# Patient Record
Sex: MALE | Race: ASIAN | NOT HISPANIC OR LATINO | Employment: FULL TIME | URBAN - METROPOLITAN AREA
[De-identification: names, ages, dates, MRNs, and addresses within clinical notes are randomized per-mention and may not be internally consistent; named-entity substitution may affect disease eponyms.]

---

## 2018-08-06 ENCOUNTER — OFFICE VISIT (OUTPATIENT)
Dept: ORTHOPEDICS | Facility: CLINIC | Age: 23
End: 2018-08-06
Payer: COMMERCIAL

## 2018-08-06 ENCOUNTER — TELEPHONE (OUTPATIENT)
Dept: ORTHOPEDICS | Facility: CLINIC | Age: 23
End: 2018-08-06

## 2018-08-06 ENCOUNTER — OFFICE VISIT (OUTPATIENT)
Dept: FAMILY MEDICINE | Facility: CLINIC | Age: 23
End: 2018-08-06
Payer: COMMERCIAL

## 2018-08-06 VITALS
WEIGHT: 283 LBS | TEMPERATURE: 98.8 F | OXYGEN SATURATION: 98 % | BODY MASS INDEX: 48.32 KG/M2 | SYSTOLIC BLOOD PRESSURE: 132 MMHG | DIASTOLIC BLOOD PRESSURE: 84 MMHG | HEART RATE: 83 BPM | HEIGHT: 64 IN | RESPIRATION RATE: 16 BRPM

## 2018-08-06 VITALS
DIASTOLIC BLOOD PRESSURE: 133 MMHG | SYSTOLIC BLOOD PRESSURE: 163 MMHG | BODY MASS INDEX: 48.32 KG/M2 | HEART RATE: 87 BPM | HEIGHT: 64 IN | WEIGHT: 283 LBS

## 2018-08-06 DIAGNOSIS — R20.0 BILATERAL HAND NUMBNESS: Primary | ICD-10-CM

## 2018-08-06 DIAGNOSIS — G56.03 BILATERAL CARPAL TUNNEL SYNDROME: Primary | ICD-10-CM

## 2018-08-06 PROCEDURE — 99213 OFFICE O/P EST LOW 20 MIN: CPT | Performed by: NURSE PRACTITIONER

## 2018-08-06 PROCEDURE — 99243 OFF/OP CNSLTJ NEW/EST LOW 30: CPT | Performed by: ORTHOPAEDIC SURGERY

## 2018-08-06 RX ORDER — PREDNISONE 20 MG/1
TABLET ORAL
Qty: 18 TABLET | Refills: 0 | Status: SHIPPED | OUTPATIENT
Start: 2018-08-06 | End: 2019-02-25

## 2018-08-06 ASSESSMENT — PAIN SCALES - GENERAL
PAINLEVEL: SEVERE PAIN (7)
PAINLEVEL: MODERATE PAIN (5)

## 2018-08-06 NOTE — LETTER
[facility] [place]  [address] [facility] [address]  [place] [address]  [phone]  WORKABILITY    [facility], Regina Palomares M.D., [place]        8/6/2018      RE: Lauri Ma    [address]  [place] [address]        To whom it may concern:     Lauri Ma is under my care for   1. Bilateral hand numbness          Return to work date: 8/6/18     ** WITH RESTRICTIONS? No restrictions      Next appointment: after EMG test has been completed        Electronically Signed (as below)  Dr. Ravinder Wright M.D.

## 2018-08-06 NOTE — LETTER
[facility]  [id number] [place] [id number]  Phone: [phone]    August 6, 2018        Lauri Ma  [address]  [place] [facility] [address]          To whom it may concern:    RE: Lauri Ma    Patient was seen and treated today at our clinic and missed work.    Please contact me for questions or concerns.      Sincerely,        VALERIE Maldonado CNP

## 2018-08-06 NOTE — LETTER
"    8/6/2018         RE: Lauri Ma  [address]  [place] [address]        Dear Colleague,    Thank you for referring your patient, Lauri Ma, to the [facility] [place]. Please see a copy of my visit note below.    CHIEF COMPLAINT:   Chief Complaint   Patient presents with     Cts     Bilateral CTS. Onset: 7/20/18. Patient states he has pain in bilateral wrist, N/T in bilateral hands. Pain with lifting or bending hands. He has wore braces on his wrists at night, sometimes during the day.     Lauri Ma is seen today in the [facility] for evaluation of bilateral carpal tunnel syndrome at the request of VALERIE Centeno CNP     HISTORY:  Lauri Ma is a 23 year old male , right -hand dominant who is seen in for bilateral hand pain, numbness and tingling. Symptoms have overall been present for about 5 years. Symptoms started after working with [employer]. Improved when he quit his job. Recently started another job delivering tiles and started have carpal tunnel syndrome like symptoms again. Today he pas severe, pain, 7/10. Numbness and tingling of the radial 4 fingers. Does not affect the small finger. Also feels the fingers are stiff and \"bloated\". His symptoms are aggravated with driving and holding the phone up for long periods of time. No recent treatments. Used to wear wrist braces 5 years ago but once his symptoms resolved, he quit using them and lost them. Recently started on oral prednisone.      Suspected cause: Due to work related movements.    Pain severity: 7/10  Pain quality: aching  Frequency of symptoms: frequently.  Aggravating Factors: with lifting, and using the hands.  Relieving Factors: at rest.  Previous modalities tried: bilateral splints  Prior wrist injury/trauma: none    Usual level of recreational activity: sedentary  Usual level of work activity: heavy labor - climbing/heavy lifting    Orthopedic PMH: history of bilateral carpal tunnel " syndrome in the past.     Other PMH:  has a past medical history of Acromioclavicular joint separation and CARDIOVASCULAR SCREENING; LDL GOAL LESS THAN 160.  Patient Active Problem List    Diagnosis Date Noted     Overweight (BMI 25.0-29.9) 05/21/2013     Priority: Medium     Acromioclavicular joint separation      Priority: Medium     CARDIOVASCULAR SCREENING; LDL GOAL LESS THAN 160      Priority: Medium       Surgical Hx:  has a past surgical history that includes no history of surgery.    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 He has never used smokeless tobacco. He reports that he drinks alcohol. He reports that he does not use illicit drugs.    Family Hx: family history includes Cancer in his maternal grandmother; Cerebrovascular Disease in his maternal grandfather; Unknown/Adopted in his paternal grandfather and paternal grandmother.. Negative for bleeding/clotting disorders. Negative for adverse anesthesia reactions.    REVIEW OF SYSTEMS: 10 point ROS neg other than the symptoms noted above in the HPI and PMH. Notables include  CONSTITUTIONAL:NEGATIVE for fever, chills, change in weight  INTEGUMENTARY/SKIN: NEGATIVE for worrisome rashes, moles or lesions  MUSCULOSKELETAL:See HPI above  Neurology: see HPI above.    This document serves as a record of the services and decisions personally performed and made by Ravinder Wright MD. It was created on his behalf by Alena Youngblood, a trained medical scribe. The creation of this document is based  "the provider's statements to the medical scribe.    Naderibanamaria Youngblood 4:01 PM 8/6/2018       EXAM:  BP (!) 163/133  Pulse 87  Ht 5' 4\" (1.626 m)  Wt 283 lb (128.4 kg)  BMI 48.58 kg/m2  GENERAL APPEARANCE: obese, alert and no distress   GAIT: NORMAL  SKIN: no suspicious lesions or rashes  RESPIRATORY: No increased work of breathing.  NEURO:     strength: normal,    thenar fasiculations: negative    Thenar atrophy: none.    Sensation intact in all fingers,    reflexes normal in upper extremities.   PSYCH:  mentation appears normal and affect normal, not anxious.    MUSCULOSKELETAL:    RIGHT HAND/FINGERS:    Skin intact. No abnormal skin discoloration, erythema or ecchymosis.   No nail pitting or clubbing.  Normal wear pattern, color and tone.  No observable or palpable masses of the fingers or palm or wrist.  No palpable triggering of fingers.   No observable or palpable cords or nodules of the fingers or palm.    There is no swelling in the wrist, hand, forearm.  There is no tenderness in the wrist.  There is no ecchymosis.  There is no erythema of the surrounding skin.  There is no maceration of the skin.  There is no deformity in the area.  Intact extensors. No extensor lag.    Special tests wrist:    Tinel's positive    Flexion/compression test positive.     Special tests medial elbow ulnar nerve:    Tinel's negative,     Special tests median nerve proximal forearm:    Tinel's negative.    1st carpometacarpal grind: negative    Intact sensation light touch median, radial, ulnar nerves of the hand  Intact sensation to the radial and ulnar digital nerves of the fingers, as well as the finger tips.  Intact epl fpl fdp edc wrist flexion/extension biceps/triceps deltoid  Brisk capillary refill to all fingers.   Palpable radial pulse, 2+.      LEFT HAND/FINGERS:    Skin intact. No abnormal skin discoloration, erythema or ecchymosis.   No nail pitting or clubbing.  Normal wear pattern, color and tone.  No " observable or palpable masses of the fingers or palm or wrist.  No palpable triggering of fingers.   No observable or palpable cords or nodules of the fingers or palm.    There is no swelling in the wrist, hand, forearm.  There is no tenderness in the wrist.  There is no ecchymosis.  There is no erythema of the surrounding skin.  There is no maceration of the skin.  There is no deformity in the area.  Intact extensors. No extensor lag.    Special tests wrist:    Tinel's equivocal ,    Flexion/compression test positive .    Special tests medial elbow ulnar nerve:    Tinel's negative,     Special tests median nerve proximal forearm:    Tinel's negative.    1st carpometacarpal grind: negative     Intact sensation light touch median, radial, ulnar nerves of the hand  Intact sensation to the radial and ulnar digital nerves of the fingers, as well as the finger tips.  Intact epl fpl fdp edc wrist flexion/extension biceps/triceps deltoid  Brisk capillary refill to all fingers.   Palpable radial pulse, 2+.      XRAYS: none.    ASSESSMENT: 23 year old male with bilateral hand pain, numbness and tingling, likely carpal tunnel syndrome.    PLAN:    * discussed with patient signs and symptoms consistent with carpal tunnel syndrome. Carpal tunnel syndrome is compression or pinching of the median nerve at the wrist, as it enters the hand. There are many different causes, and in most cases, multifactorial.  * Reviewed imaging with patient. Also, clinical exam findings.  * Discussed with patient that based on physical exam findings, it is not possible to completely rule out other neurological issues.  * An EMG of bilateral upper extremity was ordered for further evaluation.     * Rest  * Activity modification - avoid activities that aggravate symptoms.  * NSAIDS - regular use for inflammation, with food, as long as no contra-indications. Please discuss with pcp if needed.  * Ice twice daily to three times daily.  * Compression  wrap  * Elevation of extremity to reduce swelling  * Tylenol as needed for pain    * After having the EMG , I would like the patient to call me so that we can discuss the results as well as how to proceed.  * Patient to follow up with Primary Care provider regarding elevated blood pressure        The information in this document, created by a scribe for me, accurately reflects the services I personally performed and the decisions made by me. I have reviewed and approved this document for accuracy.        Ravinder Wrgiht M.D., M.S.  Dept. of Orthopaedic Surgery  [facility]      Patient was fitted with bilateral short wrist braces, flattened. All questions were answered to patient's satisfaction. DME form explained, signed, and copy given to the patient for their records.   Beverly Fuentes Clinical Medical Assistant        Again, thank you for allowing me to participate in the care of your patient.        Sincerely,        Ravinder Wright MD

## 2018-08-06 NOTE — PROGRESS NOTES
Patient was fitted with bilateral short wrist braces, flattened. All questions were answered to patient's satisfaction. DME form explained, signed, and copy given to the patient for their records.   Beverly Fuentes Clinical Medical Assistant

## 2018-08-06 NOTE — PROGRESS NOTES
"CHIEF COMPLAINT:   Chief Complaint   Patient presents with     Cts     Bilateral CTS. Onset: 7/20/18. Patient states he has pain in bilateral wrist, N/T in bilateral hands. Pain with lifting or bending hands. He has wore braces on his wrists at night, sometimes during the day.     Lauri Ma is seen today in the [facility] for evaluation of bilateral carpal tunnel syndrome at the request of VALERIE Centeno CNP     HISTORY:  Lauri Ma is a 23 year old male , right -hand dominant who is seen in for bilateral hand pain, numbness and tingling. Symptoms have overall been present for about 5 years. Symptoms started after working with [employer]. Improved when he quit his job. Recently started another job delivering tiles and started have carpal tunnel syndrome like symptoms again. Today he pas severe, pain, 7/10. Numbness and tingling of the radial 4 fingers. Does not affect the small finger. Also feels the fingers are stiff and \"bloated\". His symptoms are aggravated with driving and holding the phone up for long periods of time. No recent treatments. Used to wear wrist braces 5 years ago but once his symptoms resolved, he quit using them and lost them. Recently started on oral prednisone.      Suspected cause: Due to work related movements.    Pain severity: 7/10  Pain quality: aching  Frequency of symptoms: frequently.  Aggravating Factors: with lifting, and using the hands.  Relieving Factors: at rest.  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     Other PMH:  has a past medical history of Acromioclavicular joint separation and CARDIOVASCULAR SCREENING; LDL GOAL LESS THAN 160.  Patient Active Problem List    Diagnosis Date Noted     Overweight (BMI 25.0-29.9) 05/21/2013 " "    Priority: Medium     Acromioclavicular joint separation      Priority: Medium     CARDIOVASCULAR SCREENING; LDL GOAL LESS THAN 160      Priority: Medium       Surgical Hx:  has a past surgical history that includes no history of surgery.    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 He has never used smokeless tobacco. He reports that he drinks alcohol. He reports that he does not use illicit drugs.    Family Hx: family history includes Cancer in his maternal grandmother; Cerebrovascular Disease in his maternal grandfather; Unknown/Adopted in his paternal grandfather and paternal grandmother.. Negative for bleeding/clotting disorders. Negative for adverse anesthesia reactions.    REVIEW OF SYSTEMS: 10 point ROS neg other than the symptoms noted above in the HPI and PMH. Notables include  CONSTITUTIONAL:NEGATIVE for fever, chills, change in weight  INTEGUMENTARY/SKIN: NEGATIVE for worrisome rashes, moles or lesions  MUSCULOSKELETAL:See HPI above  Neurology: see HPI above.    This document serves as a record of the services and decisions personally performed and made by Ravinder Wright MD. It was created on his behalf by Alena Youngblood, a trained medical scribe. The creation of this document is based the provider's statements to the medical scribe.    Cristin Youngblood 4:01 PM 8/6/2018       EXAM:  BP (!) 163/133  Pulse 87  Ht 5' 4\" (1.626 m)  Wt 283 lb (128.4 kg)  BMI 48.58 kg/m2  GENERAL APPEARANCE: obese, alert and no distress   GAIT: " NORMAL  SKIN: no suspicious lesions or rashes  RESPIRATORY: No increased work of breathing.  NEURO:     strength: normal,    thenar fasiculations: negative    Thenar atrophy: none.    Sensation intact in all fingers,    reflexes normal in upper extremities.   PSYCH:  mentation appears normal and affect normal, not anxious.    MUSCULOSKELETAL:    RIGHT HAND/FINGERS:    Skin intact. No abnormal skin discoloration, erythema or ecchymosis.   No nail pitting or clubbing.  Normal wear pattern, color and tone.  No observable or palpable masses of the fingers or palm or wrist.  No palpable triggering of fingers.   No observable or palpable cords or nodules of the fingers or palm.    There is no swelling in the wrist, hand, forearm.  There is no tenderness in the wrist.  There is no ecchymosis.  There is no erythema of the surrounding skin.  There is no maceration of the skin.  There is no deformity in the area.  Intact extensors. No extensor lag.    Special tests wrist:    Tinel's positive    Flexion/compression test positive.     Special tests medial elbow ulnar nerve:    Tinel's negative,     Special tests median nerve proximal forearm:    Tinel's negative.    1st carpometacarpal grind: negative    Intact sensation light touch median, radial, ulnar nerves of the hand  Intact sensation to the radial and ulnar digital nerves of the fingers, as well as the finger tips.  Intact epl fpl fdp edc wrist flexion/extension biceps/triceps deltoid  Brisk capillary refill to all fingers.   Palpable radial pulse, 2+.      LEFT HAND/FINGERS:    Skin intact. No abnormal skin discoloration, erythema or ecchymosis.   No nail pitting or clubbing.  Normal wear pattern, color and tone.  No observable or palpable masses of the fingers or palm or wrist.  No palpable triggering of fingers.   No observable or palpable cords or nodules of the fingers or palm.    There is no swelling in the wrist, hand, forearm.  There is no tenderness in the  wrist.  There is no ecchymosis.  There is no erythema of the surrounding skin.  There is no maceration of the skin.  There is no deformity in the area.  Intact extensors. No extensor lag.    Special tests wrist:    Tinel's equivocal ,    Flexion/compression test positive .    Special tests medial elbow ulnar nerve:    Tinel's negative,     Special tests median nerve proximal forearm:    Tinel's negative.    1st carpometacarpal grind: negative     Intact sensation light touch median, radial, ulnar nerves of the hand  Intact sensation to the radial and ulnar digital nerves of the fingers, as well as the finger tips.  Intact epl fpl fdp edc wrist flexion/extension biceps/triceps deltoid  Brisk capillary refill to all fingers.   Palpable radial pulse, 2+.      XRAYS: none.    ASSESSMENT: 23 year old male with bilateral hand pain, numbness and tingling, likely carpal tunnel syndrome.    PLAN:    * discussed with patient signs and symptoms consistent with carpal tunnel syndrome. Carpal tunnel syndrome is compression or pinching of the median nerve at the wrist, as it enters the hand. There are many different causes, and in most cases, multifactorial.  * Reviewed imaging with patient. Also, clinical exam findings.  * Discussed with patient that based on physical exam findings, it is not possible to completely rule out other neurological issues.  * An EMG of bilateral upper extremity was ordered for further evaluation.     * Rest  * Activity modification - avoid activities that aggravate symptoms.  * NSAIDS - regular use for inflammation, with food, as long as no contra-indications. Please discuss with pcp if needed.  * Ice twice daily to three times daily.  * Compression wrap  * Elevation of extremity to reduce swelling  * Tylenol as needed for pain    * After having the EMG , I would like the patient to call me so that we can discuss the results as well as how to proceed.  * Patient to follow up with Primary Care provider  regarding elevated blood pressure        The information in this document, created by a scribe for me, accurately reflects the services I personally performed and the decisions made by me. I have reviewed and approved this document for accuracy.        Ravinder Wright M.D., M.S.  Dept. of Orthopaedic Surgery  [facility]

## 2018-08-06 NOTE — MR AVS SNAPSHOT
After Visit Summary   8/6/2018    Lauri Ma    MRN: [id number]           Patient Information     Date Of Birth          1995        Visit Information        Provider Department      8/6/2018 12:00 PM Roseanna Mckeon APRN [facility]        Today's Diagnoses     Bilateral carpal tunnel syndrome    -  1      Care Instructions      Carpal Tunnel Syndrome    Carpal tunnel syndrome is a painful condition of the wrist and arm. It is caused by pressure on the median nerve.  The median nerve is one of the nerves that give feeling and movement to the hand. It passes through a tunnel in the wrist called the carpal tunnel. This tunnel is made up of bones and ligaments. Narrowing of this tunnel or swelling of the tissues inside the tunnel puts pressure on the median nerve. This causes numbness, pins and needles, or electric shooting pains in your hand and forearm. Often the pain is worse at night and may wake you when you are asleep.  Carpal tunnel syndrome may occur during pregnancy and with use of birth control pills. It is more common in workers who must often bend their wrists. It is also common in people who work with power tools that cause strong vibrations.  Home care    Rest the painful wrist. Avoid repeated bending of the wrist back and forth. This puts pressure on the median nerve. Avoid using power tools with strong vibrations.    If you were given a splint, wear it at night while you sleep. You may also wear it during the day for comfort.    Move your fingers and wrists often to avoid stiffness.    Elevate your arms on pillows when you lie down.    Try using the unaffected hand more.    Try not to hold your wrists in a bent, downward position.    Sometimes changes in the work place may ease symptoms. If you type most of the day, it may help to change the position of your keyboard or add a wrist support. Your wrist should be in a neutral position and not bent back when  typing.    You may use over-the-counter pain medicine to treat pain and inflammation, unless another medicine was prescribed. Anti-inflammatory pain medicines, such as ibuprofen or naproxen may be more effective than acetaminophen, which treats pain, but not inflammation. If you have chronic liver or kidney disease or ever had a stomach ulcer or GI bleeding, talk with your doctor before using these medicines.    Opioid pain medicine will only give temporary relief and does not treat the problem. If pain continues, you may need a shot of a steroid drug into your wrist.    If the above methods fail, you may need surgery. This will open the carpal tunnel and release the pressure on the trapped nerve.  Follow-up care  Follow up with your healthcare provider, or as advised, if the pain doesn t begin to improve within the next week.  If X-rays were taken, you will be notified of any new findings that may affect your care.  When to seek medical advice  Call your healthcare provider right away if any of these occur:    Pain not improving with the above treatment    Fingers or hand become cold, blue, numb, or tingly    Your whole arm becomes swollen or weak  Date Last Reviewed: 11/23/2015 2000-2017 [employer]. [address] [place] [address]. All rights reserved. This information is not intended as a substitute for professional medical care. Always follow your healthcare professional's instructions.                Follow-ups after your visit        Additional Services     ORTHO [facility] REFERRAL       [facility] is referring you to the Orthopedic  Services at [facility].       The  Representative will assist you in the coordination of your Orthopedic and Musculoskeletal Care as prescribed by your physician.    The  Representative will call you within 1 business day to help schedule your appointment, or you may contact the   Representative at:    All areas ~ [phone]     Type of Referral : Non Surgical       Timeframe requested: 3 - 5 days    Coverage of these services is subject to the terms and limitations of your health insurance plan.  Please call member services at your health plan with any benefit or coverage questions.      If X-rays, CT or MRI's have been performed, please contact the facility where they were done to arrange for , prior to your scheduled appointment.  Please bring this referral request to your appointment and present it to your specialist.                  Who to contact     If you have questions or need follow up information about today's clinic visit or your schedule please contact [facility] directly at [phone].  Normal or non-critical lab and imaging results will be communicated to you by MyChart, letter or phone within 4 business days after the clinic has received the results. If you do not hear from us within 7 days, please contact the clinic through [employer] or phone. If you have a critical or abnormal lab result, we will notify you by phone as soon as possible.  Submit refill requests through [employer] or call your pharmacy and they will forward the refill request to us. Please allow 3 business days for your refill to be completed.          Additional Information About Your Visit        [employer] Information     [employer] gives you secure access to your electronic health record. If you see a primary care provider, you can also send messages to your care team and make appointments. If you have questions, please call your primary care clinic.  If you do not have a primary care provider, please call [phone] and they will assist you.        Care EveryWhere ID     This is your Care EveryWhere ID. This could be used by other organizations to access your [place] medical records  [id number]        Your Vitals Were     Pulse Temperature Respirations Height Pulse Oximetry BMI (Body  "Mass Index)    83 98.8  F (37.1  C) (Oral) 16 5' 4\" (1.626 m) 98% 48.58 kg/m2       Blood Pressure from Last 3 Encounters:   08/06/18 132/84   05/05/16 138/86   05/26/15 (!) 136/96    Weight from Last 3 Encounters:   08/06/18 283 lb (128.4 kg)   05/05/16 245 lb (111.1 kg)   05/26/15 217 lb (98.4 kg)              We Performed the Following     ORTHO  REFERRAL          Today's Medication Changes          These changes are accurate as of 8/6/18  2:57 PM.  If you have any questions, ask your nurse or doctor.               Start taking these medicines.        Dose/Directions    predniSONE 20 MG tablet   Commonly known as:  DELTASONE   Used for:  Bilateral carpal tunnel syndrome   Started by:  Roseanna Mckeon APRN CNP        Take 3 tabs (60 mg) by mouth daily x 3 days, 2 tabs (40 mg) daily x 3 days, 1 tab (20 mg) daily x 3 days   Quantity:  18 tablet   Refills:  0            Where to get your medicines      These medications were sent to [facility] [id number] [place] [address], [address]  [address] [address] [address]     Phone:  [phone]     predniSONE 20 MG tablet                Primary Care Provider Office Phone # Fax #    Tika Masters [phone]       [facility] [address] [place] [id number]        Equal Access to Services     [facility] BROOKS AH: Hadii federico ku hadasho Soomaali, waaxda luqadaha, qaybta kaalmada adeegyada, waxay charlie goncalves. So [place] -[phone].    ATENCIÓN: Si olindala danny, tiene a hernandez disposición servicios gratuitos de asistencia lingüística. Llame al [phone].    We comply with applicable federal civil rights laws and [place] laws. We do not discriminate on the basis of race, color, national origin, age, disability, sex, sexual orientation, or gender identity.            Thank you!     Thank you for choosing [facility]  for your care. Our goal is always to provide " you with excellent care. Hearing back from our patients is one way we can continue to improve our services. Please take a few minutes to complete the written survey that you may receive in the mail after your visit with us. Thank you!             Your Updated Medication List - Protect others around you: Learn how to safely use, store and throw away your medicines at www.disposemymeds.org.          This list is accurate as of 8/6/18  2:57 PM.  Always use your most recent med list.                   Brand Name Dispense Instructions for use Diagnosis    cyclobenzaprine 10 MG tablet    FLEXERIL    30 tablet    Take 0.5-1 tablets (5-10 mg) by mouth 3 times daily as needed for muscle spasms    Left-sided low back pain without sciatica       predniSONE 20 MG tablet    DELTASONE    18 tablet    Take 3 tabs (60 mg) by mouth daily x 3 days, 2 tabs (40 mg) daily x 3 days, 1 tab (20 mg) daily x 3 days    Bilateral carpal tunnel syndrome

## 2018-08-06 NOTE — TELEPHONE ENCOUNTER
Faxed Neurology referral to [facility] at [phone]. Fax confirmed.  Beverly Fuentes Certified Medical Assistant

## 2018-08-06 NOTE — PATIENT INSTRUCTIONS
Carpal Tunnel Syndrome    Carpal tunnel syndrome is a painful condition of the wrist and arm. It is caused by pressure on the median nerve.  The median nerve is one of the nerves that give feeling and movement to the hand. It passes through a tunnel in the wrist called the carpal tunnel. This tunnel is made up of bones and ligaments. Narrowing of this tunnel or swelling of the tissues inside the tunnel puts pressure on the median nerve. This causes numbness, pins and needles, or electric shooting pains in your hand and forearm. Often the pain is worse at night and may wake you when you are asleep.  Carpal tunnel syndrome may occur during pregnancy and with use of birth control pills. It is more common in workers who must often bend their wrists. It is also common in people who work with power tools that cause strong vibrations.  Home care    Rest the painful wrist. Avoid repeated bending of the wrist back and forth. This puts pressure on the median nerve. Avoid using power tools with strong vibrations.    If you were given a splint, wear it at night while you sleep. You may also wear it during the day for comfort.    Move your fingers and wrists often to avoid stiffness.    Elevate your arms on pillows when you lie down.    Try using the unaffected hand more.    Try not to hold your wrists in a bent, downward position.    Sometimes changes in the work place may ease symptoms. If you type most of the day, it may help to change the position of your keyboard or add a wrist support. Your wrist should be in a neutral position and not bent back when typing.    You may use over-the-counter pain medicine to treat pain and inflammation, unless another medicine was prescribed. Anti-inflammatory pain medicines, such as ibuprofen or naproxen may be more effective than acetaminophen, which treats pain, but not inflammation. If you have chronic liver or kidney disease or ever had a stomach ulcer or GI bleeding, talk with your  doctor before using these medicines.    Opioid pain medicine will only give temporary relief and does not treat the problem. If pain continues, you may need a shot of a steroid drug into your wrist.    If the above methods fail, you may need surgery. This will open the carpal tunnel and release the pressure on the trapped nerve.  Follow-up care  Follow up with your healthcare provider, or as advised, if the pain doesn t begin to improve within the next week.  If X-rays were taken, you will be notified of any new findings that may affect your care.  When to seek medical advice  Call your healthcare provider right away if any of these occur:    Pain not improving with the above treatment    Fingers or hand become cold, blue, numb, or tingly    Your whole arm becomes swollen or weak  Date Last Reviewed: 11/23/2015 2000-2017 [employer]. [address] [place] [address], [place] [address]. All rights reserved. This information is not intended as a substitute for professional medical care. Always follow your healthcare professional's instructions.

## 2018-08-06 NOTE — PROGRESS NOTES
"  SUBJECTIVE:   Lauri Ma is a 23 year old male who presents to clinic today for the following health issues:      Musculoskeletal problem/pain      Duration: 2 weeks    Description  Location: both arms, wrist to finger tips    Intensity:  moderate    Accompanying signs and symptoms: numbness, tingling, weakness and swelling    History  Previous similar problem: YES  Previous evaluation:  none    Precipitating or alleviating factors:  Trauma or overuse: YES- overuse  Aggravating factors include: overuse    Therapies tried and outcome: support wrap    Has hx of this has been seen for it years ago but didn't think braces helped    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       Problem list and histories reviewed & adjusted, as indicated.  Additional history: as documented    Patient Active Problem List   Diagnosis     CARDIOVASCULAR SCREENING; LDL GOAL LESS THAN 160     Acromioclavicular joint separation     Overweight (BMI 25.0-29.9)     Past Surgical History:   Procedure Laterality Date     NO HISTORY OF SURGERY         Social History   Substance Use Topics     Smoking status: Never Smoker     Smokeless tobacco: Never Used     Alcohol use Yes      Comment: rarely     Family History   Problem Relation Age of Onset     Cancer Maternal Grandmother      Cerebrovascular Disease Maternal Grandfather      Unknown/Adopted Paternal Grandmother      Unknown/Adopted Paternal Grandfather            Reviewed and updated as needed this visit by clinical staff  Tobacco  Allergies  Meds  Med Hx  Surg Hx  Fam Hx  Soc Hx      Reviewed and updated as needed this visit by Provider         ROS:  Constitutional, HEENT, cardiovascular, pulmonary, gi and gu systems are negative, except as otherwise noted.    OBJECTIVE:     /84  Pulse 83  Temp 98.8  F (37.1  C) (Oral)  Resp 16  Ht 5' 4\" (1.626 m)  Wt 283 lb (128.4 kg)  SpO2 98%  BMI 48.58 kg/m2  Body mass index is 48.58 " kg/(m^2).  GENERAL: alert and no distress  RESP: lungs clear to auscultation - no rales, rhonchi or wheezes  CV: regular rate and rhythm, normal S1 S2, no S3 or S4, no murmur, click or rub, no peripheral edema and peripheral pulses strong      ASSESSMENT/PLAN:       1. Bilateral carpal tunnel syndrome  Discussed treatment options. Does not want braces. Will do oral steroid at this time  If not improving should follow up with Dr Fish again for further evaluation and treatment (steroid injection, pt, surgery etc)  Patient education given.   - predniSONE (DELTASONE) 20 MG tablet; Take 3 tabs (60 mg) by mouth daily x 3 days, 2 tabs (40 mg) daily x 3 days, 1 tab (20 mg) daily x 3 days  Dispense: 18 tablet; Refill: 0  Discussed potential medication side effects.     Note given for work that he was seen here today.   See Patient Instructions    VALERIE Maldonado [facility]

## 2018-08-15 ENCOUNTER — TRANSFERRED RECORDS (OUTPATIENT)
Dept: HEALTH INFORMATION MANAGEMENT | Facility: CLINIC | Age: 23
End: 2018-08-15

## 2018-08-20 ENCOUNTER — TELEPHONE (OUTPATIENT)
Dept: ORTHOPEDICS | Facility: CLINIC | Age: 23
End: 2018-08-20

## 2018-08-20 NOTE — TELEPHONE ENCOUNTER
Called and spoke to patient regarding EMG results, showing moderate right and mild left carpal tunnel syndrome. ([facility], 8/15/2018)    * discussed with patient signs and symptoms consistent with carpal tunnel syndrome. Carpal tunnel syndrome is compression or pinching of the median nerve at the wrist, as it enters the hand. There are many different causes, and in most cases, multifactorial.    * An indepth discussion was had with him about the options for treatment, which included activity modification to avoid aggravating activities, taking breaks during activities that cause symptoms, stretching, NSAIDS to help decrease inflammation and swelling within the carpal tunnel, night splinting, corticosteroid injections, and carpal tunnel release.   * depending upon severity and duration of symptoms, nonoperative treatment is usually initiated, starting with least invasive modalities such as activity modification and a trial of night splints and NSAIDs.  * Cortisone injections are considered to decrease swelling and inflammation within the carpal tunnel and compression of the nerve.   * otherwise, carpal tunnel release should symptoms persist despite trial of nonoperative treatment, or in cases of severe carpal tunnel syndrome.  * at this time, would like to think about his options.  * return to clinic as needed .  * all patient's questions addressed and answered today.    Ravinder Wright M.D., M.S.  Dept. of Orthopaedic Surgery  [facility]

## 2018-09-17 ENCOUNTER — TELEPHONE (OUTPATIENT)
Dept: ORTHOPEDICS | Facility: CLINIC | Age: 23
End: 2018-09-17

## 2018-09-17 DIAGNOSIS — G56.03 CARPAL TUNNEL SYNDROME, BILATERAL: Primary | ICD-10-CM

## 2018-09-17 NOTE — TELEPHONE ENCOUNTER
Reason for Call:  Where can he get steroid injections    Detailed comments: Patient would like to know where he can get injections for carpal tunnel? Please call.    Phone Number Patient can be reached at: Cell number on file:    Telephone Information:   Mobile [phone]       Best Time: any    Can we leave a detailed message on this number? YES    Call taken on 9/17/2018 at 2:03 PM by Gemini Conner

## 2018-09-18 NOTE — TELEPHONE ENCOUNTER
Patient is opting for Carpal canal steroid injections as outlined by Dr Wright at his recent EMG f/u visit. . P: FSOC procedure ordered.  Discussed his questions taking into account his desires, goals and current Orthopedic treatment plan.  Krishna ALVARADO

## 2018-09-19 ENCOUNTER — OFFICE VISIT (OUTPATIENT)
Dept: FAMILY MEDICINE | Facility: CLINIC | Age: 23
End: 2018-09-19
Payer: COMMERCIAL

## 2018-09-19 VITALS
HEIGHT: 64 IN | RESPIRATION RATE: 20 BRPM | HEART RATE: 87 BPM | WEIGHT: 278.2 LBS | SYSTOLIC BLOOD PRESSURE: 158 MMHG | TEMPERATURE: 98.1 F | BODY MASS INDEX: 47.5 KG/M2 | OXYGEN SATURATION: 98 % | DIASTOLIC BLOOD PRESSURE: 126 MMHG

## 2018-09-19 DIAGNOSIS — M54.2 CERVICALGIA: Primary | ICD-10-CM

## 2018-09-19 DIAGNOSIS — R03.0 ELEVATED BLOOD PRESSURE READING WITHOUT DIAGNOSIS OF HYPERTENSION: ICD-10-CM

## 2018-09-19 DIAGNOSIS — E66.01 MORBID OBESITY (H): ICD-10-CM

## 2018-09-19 PROCEDURE — 99213 OFFICE O/P EST LOW 20 MIN: CPT | Performed by: PHYSICIAN ASSISTANT

## 2018-09-19 ASSESSMENT — PAIN SCALES - GENERAL: PAINLEVEL: MODERATE PAIN (5)

## 2018-09-19 NOTE — PROGRESS NOTES
SUBJECTIVE:   Lauri Ma is a 23 year old male who presents to clinic today for the following health issues:      Neck Pain  Onset: Sunday Morning (3 days)    Description:   Location: Rt side of the neck, just woke up with it.    Radiation: none    Intensity: mild    Progression of Symptoms:  improving    Accompanying Signs & Symptoms:  Burning, prickly sensation (paresthesias) in arm(s): no   Numbness in arm(s): no   Weakness in arm(s):  no   Fever: no   Headache: no   Nausea and/or vomiting: no     History:   Trauma: no   Previous neck pain: no   Previous surgery or injections: no   Previous Imaging (MRI,X ray): no     Precipitating factors:   Does movement increase the pain:  YES- movement and lifting    Therapies Tried and outcome:  OTC, relief       Missed work mon and Tuesday.  Went to work today again and the req workability as still having some tightness.,  Does a lot of lifting at work.    weight generally up.      Initially BP last month elev but came down.             Allergies   Allergen Reactions     Shrimp Anaphylaxis and Swelling       Past Medical History:   Diagnosis Date     Acromioclavicular joint separation      CARDIOVASCULAR SCREENING; LDL GOAL LESS THAN 160          Current Outpatient Prescriptions:      cyclobenzaprine (FLEXERIL) 10 MG tablet, Take 0.5-1 tablets (5-10 mg) by mouth 3 times daily as needed for muscle spasms, Disp: 30 tablet, Rfl: 1     predniSONE (DELTASONE) 20 MG tablet, Take 3 tabs (60 mg) by mouth daily x 3 days, 2 tabs (40 mg) daily x 3 days, 1 tab (20 mg) daily x 3 days, Disp: 18 tablet, Rfl: 0    Past Surgical History:   Procedure Laterality Date     NO HISTORY OF SURGERY         REVIEW OF SYSTEMS  General: negative for fever  Resp: negative for chest pain   CV: negative for chest pain  Musculoskeletal: as above  Neurologic: negative for HA,  one sided weakness,  paresthesias    PHYSICAL EXAM::  Vitals: BP (!) 158/126  Pulse 87  Temp 98.1  F (36.7  C) (Oral)  Resp 20  " Ht 5' 4\" (1.626 m)  Wt 278 lb 3.2 oz (126.2 kg)  SpO2 98%  BMI 47.75 kg/m2  BMI= Body mass index is 47.75 kg/(m^2).  Constitutional: healthy, alert and no acute distress   NECK::  No midline tenderness to palpation,  strength intact and equal b/l upper extremities, CINDY  GAIT: intact  NEURO:Cranial nerves intact grossly  PSYCH: mentation appears normal and affect normal/bright      Impression: Neck pain, musculoskeletal, uncomplicated.     ICD-10-CM    1. Cervicalgia M54.2    2. Morbid obesity (H) E66.01    3. Elevated blood pressure reading without diagnosis of hypertension R03.0          Plan: 2 weeks for LUIS and bp rechck. Elevated blood pressure plan discussed with patient who expressed understanding.    As ordered above. Instructions for neck care and return precautions provided.        Eugenio Quiroga PA-C        "

## 2018-09-19 NOTE — PATIENT INSTRUCTIONS
Apply ice for 24 hours then alternate heat or ice, which ever feels better. Return to clinic or Emergency Deptartment for uncontrolled pain, chest pain, shortness of breath, fever, numbness/tingling in extremities, inability to move neck. incontinence or urinary problems.     Neck Sprain Or Strain  A sudden force that causes turning or bending of the neck (such as in a car accident) can stretch or tear muscles (strain) and ligaments (sprain) and cause neck pain. Sometimes neck pain occurs after a simple awkward movement. In either case, muscle spasm is commonly present and contributes to the pain    Unless you had a forceful physical injury (for example, a car accident or fall), X-rays are usually not ordered for the initial evaluation of neck pain. If pain continues and dose not respond to medical treatment, x-rays and other tests may be performed at a later time.  Home Care:  1) You may feel more soreness and spasm the first few days after the injury. Reduce your activity level until symptoms begin to improve.  2) When lying down, use a comfortable pillow that supports the head and keeps the spine in a neutral position. The position of the head should not be tilted forward or backward.  3) Use ice packs (ice in a plastic bag, wrapped in a towel) to treat acute pain. Apply for 20 minutes every 2-4 hours during the first two days. Then, begin local heat (hot shower, hot bath or heating pad) and massage to reduce muscle spasm. Some patients feel best alternating hot and cold treatments, or just staying with one method only. Do what feels the best to you and gives the most relief.  4) You may use acetaminophen (Tylenol) or ibuprofen (Motrin, Advil) to control pain, unless another pain medicine was prescribed. [ NOTE : If you have chronic liver or kidney disease or ever had a stomach ulcer or GI bleeding, talk with your doctor before using these medicines.]  Follow Up  with your physician or this facility if your  symptoms do not show signs of improvement after one week. Physical therapy may be needed.  [NOTE: A radiologist will review any X-rays or CT scans that were taken. We will notify you of any new findings that may affect your care.]  Get Prompt Medical Attention  if any of the following occur:  -- Pain becomes worse or spreads into your arms  -- Weakness or numbness in one or both arms    [id number] [employer] [facility], [address] [place] [address], [place] [address]. All rights reserved. This information is not intended as a substitute for professional medical care. Always follow your healthcare professional's instructions.        High Blood Pressure, To Be Confirmed, No Treatment  Your blood pressure today was higher than normal. Sometimes anxiety or pain can cause a temporary rise in blood pressure. It later returns to normal. Blood pressure that is high only one time doesn t mean that you have high blood pressure (hypertension). High blood pressure is a chronic illness. But you should have your blood pressure measured again within the next few days to find out if it s still high.    Blood pressure measurements are given as 2 numbers. Systolic blood pressure is the upper number. This is the pressure when the heart contracts. Diastolic blood pressure is the lower number. This is the pressure when the heart relaxes between beats. You will see your blood pressure readings written together. For example, a person with a systolic pressure of 118 and a diastolic pressure of 78 will have 118/78 written in the medical record.  Blood pressure is categorized as normal, elevated, or stage 1 or stage 2 high blood pressure:    Normal blood pressure is systolic of less than 120 and diastolic of less than 80 (120/80)    Elevated blood pressure is systolic of 120 to 129 and diastolic less than 80    Stage 1 high blood pressure is systolic is 130 to 139 or diastolic between 80 to 89    Stage 2 high blood pressure is when systolic is 140 or  higher or the diastolic is 90 or higher  Lifestyle changes such as weight loss, exercise, and quitting smoking, can help manage your blood pressure. Have your blood pressure checked regularly to be sure it is under control.  Home care  To track your blood pressure, your provider may ask you to come into the office at different times and on different days. If your healthcare provider asks you to check your readings at home, ask him or her what times of the day to test and for how many days. Before you leave the office, ask your provider to show you how to take your blood pressure and be sure to ask questions if you don't understand something.  Consider buying an automatic blood pressure monitor. Ask your provider for a recommendation as well as the proper size cuff to fit your arm. You can buy blood pressure monitors at most pharmacies.  The American Heart Association recommends the following guidelines for home blood pressure monitoring:    Don't smoke or drink coffee or other caffeinated drinks for 30 minutes before taking your blood pressure.    Go to the bathroom before the test.    Relax for 5 minutes before taking the measurement.    Sit with your back supported (don't sit on a couch or soft chair); keep your feet on the floor uncrossed. Place your arm on a solid flat surface (like a table) with the upper part of the arm at heart level. Place the middle of the cuff directly above the bend of the elbow. Check the monitor's instruction manual for an illustration.    Take multiple readings. When you measure, take 2 to 3 readings one minute apart and record all of the results.    Take your blood pressure at the same time every day, or as your healthcare provider recommends.    Record the date, time, and blood pressure reading.    Take the record with you to your next medical appointment. If your blood pressure monitor has a built-in memory, simply take the monitor with you to your next appointment.    Call your  provider if you have several high readings. Don't be frightened by a single high blood pressure reading, but if you get several high readings, check in with your healthcare provider.    Note: When blood pressure reaches a systolic (top number) of 180 or higher OR diastolic (bottom number) of 110 or higher, seek emergency medical treatment.  Follow-up care  Keep all of your follow up appointments. If your blood pressure is more than 120 over 80 on 2 out of 3 days, you will need to follow up with your healthcare provider for more evaluation and treatment.  Don t put this off! High blood pressure can be treated. High blood pressure that s not treated raises your risk for heart attack, heart failure, and stroke.  When to seek medical advice  Call your healthcare provider right away if any of these occur:    Blood pressure reaches a systolic (top number) of 180 or higher, OR diastolic (bottom number) of 110 or higher    Chest pain or shortness of breath    Severe headache    Throbbing or rushing sound in the ears    Nosebleed    Sudden severe pain in your belly (abdomen)    Extreme drowsiness, confusion, or fainting    Dizziness or dizziness with spinning sensation (vertigo)    Weakness of an arm or leg or one side of the face    You have problems speaking or seeing   Date Last Reviewed: 12/1/2016 2000-2017 [employer]. [address] [place] [address], [place] [address]. All rights reserved. This information is not intended as a substitute for professional medical care. Always follow your healthcare professional's instructions.

## 2018-09-19 NOTE — MR AVS SNAPSHOT
After Visit Summary   9/19/2018    Lauri Ma    MRN: [id number]           Patient Information     Date Of Birth          1995        Visit Information        Provider Department      9/19/2018 10:20 AM Jesus Manuel Quiroga PA [facility]        Today's Diagnoses     Cervicalgia    -  1    Morbid obesity (H)        Elevated blood pressure reading without diagnosis of hypertension          Care Instructions    Apply ice for 24 hours then alternate heat or ice, which ever feels better. Return to clinic or Emergency Deptartment for uncontrolled pain, chest pain, shortness of breath, fever, numbness/tingling in extremities, inability to move neck. incontinence or urinary problems.     Neck Sprain Or Strain  A sudden force that causes turning or bending of the neck (such as in a car accident) can stretch or tear muscles (strain) and ligaments (sprain) and cause neck pain. Sometimes neck pain occurs after a simple awkward movement. In either case, muscle spasm is commonly present and contributes to the pain    Unless you had a forceful physical injury (for example, a car accident or fall), X-rays are usually not ordered for the initial evaluation of neck pain. If pain continues and dose not respond to medical treatment, x-rays and other tests may be performed at a later time.  Home Care:  1) You may feel more soreness and spasm the first few days after the injury. Reduce your activity level until symptoms begin to improve.  2) When lying down, use a comfortable pillow that supports the head and keeps the spine in a neutral position. The position of the head should not be tilted forward or backward.  3) Use ice packs (ice in a plastic bag, wrapped in a towel) to treat acute pain. Apply for 20 minutes every 2-4 hours during the first two days. Then, begin local heat (hot shower, hot bath or heating pad) and massage to reduce muscle spasm. Some patients feel best alternating hot  and cold treatments, or just staying with one method only. Do what feels the best to you and gives the most relief.  4) You may use acetaminophen (Tylenol) or ibuprofen (Motrin, Advil) to control pain, unless another pain medicine was prescribed. [ NOTE : If you have chronic liver or kidney disease or ever had a stomach ulcer or GI bleeding, talk with your doctor before using these medicines.]  Follow Up  with your physician or this facility if your symptoms do not show signs of improvement after one week. Physical therapy may be needed.  [NOTE: A radiologist will review any X-rays or CT scans that were taken. We will notify you of any new findings that may affect your care.]  Get Prompt Medical Attention  if any of the following occur:  -- Pain becomes worse or spreads into your arms  -- Weakness or numbness in one or both arms    5694-9085 [facility], [address] [place] [address]. All rights reserved. This information is not intended as a substitute for professional medical care. Always follow your healthcare professional's instructions.        High Blood Pressure, To Be Confirmed, No Treatment  Your blood pressure today was higher than normal. Sometimes anxiety or pain can cause a temporary rise in blood pressure. It later returns to normal. Blood pressure that is high only one time doesn t mean that you have high blood pressure (hypertension). High blood pressure is a chronic illness. But you should have your blood pressure measured again within the next few days to find out if it s still high.    Blood pressure measurements are given as 2 numbers. Systolic blood pressure is the upper number. This is the pressure when the heart contracts. Diastolic blood pressure is the lower number. This is the pressure when the heart relaxes between beats. You will see your blood pressure readings written together. For example, a person with a systolic pressure of 118 and a diastolic pressure of 78 will have  118/78 written in the medical record.  Blood pressure is categorized as normal, elevated, or stage 1 or stage 2 high blood pressure:    Normal blood pressure is systolic of less than 120 and diastolic of less than 80 (120/80)    Elevated blood pressure is systolic of 120 to 129 and diastolic less than 80    Stage 1 high blood pressure is systolic is 130 to 139 or diastolic between 80 to 89    Stage 2 high blood pressure is when systolic is 140 or higher or the diastolic is 90 or higher  Lifestyle changes such as weight loss, exercise, and quitting smoking, can help manage your blood pressure. Have your blood pressure checked regularly to be sure it is under control.  Home care  To track your blood pressure, your provider may ask you to come into the office at different times and on different days. If your healthcare provider asks you to check your readings at home, ask him or her what times of the day to test and for how many days. Before you leave the office, ask your provider to show you how to take your blood pressure and be sure to ask questions if you don't understand something.  Consider buying an automatic blood pressure monitor. Ask your provider for a recommendation as well as the proper size cuff to fit your arm. You can buy blood pressure monitors at most pharmacies.  The American Heart Association recommends the following guidelines for home blood pressure monitoring:    Don't smoke or drink coffee or other caffeinated drinks for 30 minutes before taking your blood pressure.    Go to the bathroom before the test.    Relax for 5 minutes before taking the measurement.    Sit with your back supported (don't sit on a couch or soft chair); keep your feet on the floor uncrossed. Place your arm on a solid flat surface (like a table) with the upper part of the arm at heart level. Place the middle of the cuff directly above the bend of the elbow. Check the monitor's instruction manual for an illustration.    Take  multiple readings. When you measure, take 2 to 3 readings one minute apart and record all of the results.    Take your blood pressure at the same time every day, or as your healthcare provider recommends.    Record the date, time, and blood pressure reading.    Take the record with you to your next medical appointment. If your blood pressure monitor has a built-in memory, simply take the monitor with you to your next appointment.    Call your provider if you have several high readings. Don't be frightened by a single high blood pressure reading, but if you get several high readings, check in with your healthcare provider.    Note: When blood pressure reaches a systolic (top number) of 180 or higher OR diastolic (bottom number) of 110 or higher, seek emergency medical treatment.  Follow-up care  Keep all of your follow up appointments. If your blood pressure is more than 120 over 80 on 2 out of 3 days, you will need to follow up with your healthcare provider for more evaluation and treatment.  Don t put this off! High blood pressure can be treated. High blood pressure that s not treated raises your risk for heart attack, heart failure, and stroke.  When to seek medical advice  Call your healthcare provider right away if any of these occur:    Blood pressure reaches a systolic (top number) of 180 or higher, OR diastolic (bottom number) of 110 or higher    Chest pain or shortness of breath    Severe headache    Throbbing or rushing sound in the ears    Nosebleed    Sudden severe pain in your belly (abdomen)    Extreme drowsiness, confusion, or fainting    Dizziness or dizziness with spinning sensation (vertigo)    Weakness of an arm or leg or one side of the face    You have problems speaking or seeing   Date Last Reviewed: 12/1/2016 2000-2017 [employer]. [address] [place] [address], [place] [address]. All rights reserved. This information is not intended as a substitute for professional medical care.  Always follow your healthcare professional's instructions.                Follow-ups after your visit        Follow-up notes from your care team     Return in about 2 weeks (around 10/3/2018), or if symptoms worsen or fail to improve, for Annual Physical.      Your next 10 appointments already scheduled     Sep 28, 2018  3:40 PM CDT   PROCEDURE with Sean Mcgregor, DO   [facility] [place] ([facility] [place])    [address]  [place] [address] [phone]              Who to contact     If you have questions or need follow up information about today's clinic visit or your schedule please contact [facility] directly at [phone].  Normal or non-critical lab and imaging results will be communicated to you by MyChart, letter or phone within 4 business days after the clinic has received the results. If you do not hear from us within 7 days, please contact the clinic through [employer] or phone. If you have a critical or abnormal lab result, we will notify you by phone as soon as possible.  Submit refill requests through [employer] or call your pharmacy and they will forward the refill request to us. Please allow 3 business days for your refill to be completed.          Additional Information About Your Visit        [employer] Information     [employer] gives you secure access to your electronic health record. If you see a primary care provider, you can also send messages to your care team and make appointments. If you have questions, please call your primary care clinic.  If you do not have a primary care provider, please call [phone] and they will assist you.        Care EveryWhere ID     This is your Care EveryWhere ID. This could be used by other organizations to access your [place] medical records  [id number]        Your Vitals Were     Pulse Temperature Respirations Height Pulse Oximetry BMI (Body Mass Index)    87 98.1  F  "(36.7  C) (Oral) 20 5' 4\" (1.626 m) 98% 47.75 kg/m2       Blood Pressure from Last 3 Encounters:   09/19/18 (!) 158/126   08/06/18 (!) 163/133   08/06/18 132/84    Weight from Last 3 Encounters:   09/19/18 278 lb 3.2 oz (126.2 kg)   08/06/18 283 lb (128.4 kg)   08/06/18 283 lb (128.4 kg)              Today, you had the following     No orders found for display       Primary Care Provider Office Phone # Fax #    Tika Masters [phone]       [facility] [address] [place] [address]        Equal Access to Services     SAMUEL GUY : Carole whitingo Soanil, waaxda luqadaha, qaybta kaalmada adeegyada, samir mukherjee . So [facility] [phone].    ATENCIÓN: Si habla español, tiene a hernandez disposición servicios gratuitos de asistencia lingüística. Llame al [phone].    We comply with applicable federal civil rights laws and [place] laws. We do not discriminate on the basis of race, color, national origin, age, disability, sex, sexual orientation, or gender identity.            Thank you!     Thank you for choosing [facility]  for your care. Our goal is always to provide you with excellent care. Hearing back from our patients is one way we can continue to improve our services. Please take a few minutes to complete the written survey that you may receive in the mail after your visit with us. Thank you!             Your Updated Medication List - Protect others around you: Learn how to safely use, store and throw away your medicines at www.disposemymeds.org.          This list is accurate as of 9/19/18 10:46 AM.  Always use your most recent med list.                   Brand Name Dispense Instructions for use Diagnosis    cyclobenzaprine 10 MG tablet    FLEXERIL    30 tablet    Take 0.5-1 tablets (5-10 mg) by mouth 3 times daily as needed for muscle spasms    Left-sided low back pain without sciatica       predniSONE 20 MG tablet "    DELTASONE    18 tablet    Take 3 tabs (60 mg) by mouth daily x 3 days, 2 tabs (40 mg) daily x 3 days, 1 tab (20 mg) daily x 3 days    Bilateral carpal tunnel syndrome

## 2018-09-19 NOTE — LETTER
[address] [address]  Phone: [phone]    September 19, 2018        Lauri Ma  [address] [facility] [address]          To whom it may concern:    RE: Lauri Ma    Patient was seen and treated today at our clinic.  Patient may return to work tomorrrow with the following:  Light duty-unable to lift more than 15 pounds until 9/24/18.  fI light duty not available, patient is excused from work until 9/24/18.  He may return to work without restrictions 9/24/18.       Please contact me for questions or concerns.      Sincerely,        CESAR Raza

## 2018-09-28 ENCOUNTER — OFFICE VISIT (OUTPATIENT)
Dept: ORTHOPEDICS | Facility: CLINIC | Age: 23
End: 2018-09-28
Payer: COMMERCIAL

## 2018-09-28 VITALS
BODY MASS INDEX: 47.46 KG/M2 | DIASTOLIC BLOOD PRESSURE: 86 MMHG | SYSTOLIC BLOOD PRESSURE: 138 MMHG | HEIGHT: 64 IN | WEIGHT: 278 LBS

## 2018-09-28 DIAGNOSIS — G56.03 CARPAL TUNNEL SYNDROME, BILATERAL: Primary | ICD-10-CM

## 2018-09-28 PROCEDURE — 20526 THER INJECTION CARP TUNNEL: CPT | Mod: 50 | Performed by: FAMILY MEDICINE

## 2018-09-28 PROCEDURE — 76942 ECHO GUIDE FOR BIOPSY: CPT | Performed by: FAMILY MEDICINE

## 2018-09-28 RX ORDER — TRIAMCINOLONE ACETONIDE 40 MG/ML
40 INJECTION, SUSPENSION INTRA-ARTICULAR; INTRAMUSCULAR
Status: DISCONTINUED | OUTPATIENT
Start: 2018-09-28 | End: 2019-02-25

## 2018-09-28 RX ORDER — LIDOCAINE HYDROCHLORIDE 10 MG/ML
2 INJECTION, SOLUTION INFILTRATION; PERINEURAL
Status: DISCONTINUED | OUTPATIENT
Start: 2018-09-28 | End: 2019-02-25

## 2018-09-28 RX ORDER — TRIAMCINOLONE ACETONIDE 40 MG/ML
40 INJECTION, SUSPENSION INTRA-ARTICULAR; INTRAMUSCULAR
Status: DISCONTINUED | OUTPATIENT
Start: 2018-09-28 | End: 2021-06-11

## 2018-09-28 RX ADMIN — LIDOCAINE HYDROCHLORIDE 2 ML: 10 INJECTION, SOLUTION INFILTRATION; PERINEURAL at 16:45

## 2018-09-28 RX ADMIN — TRIAMCINOLONE ACETONIDE 40 MG: 40 INJECTION, SUSPENSION INTRA-ARTICULAR; INTRAMUSCULAR at 16:45

## 2018-09-28 NOTE — MR AVS SNAPSHOT
After Visit Summary   9/28/2018    Lauri Ma    MRN: [id number]           Patient Information     Date Of Birth          1995        Visit Information        Provider Department      9/28/2018 3:40 PM Sean Mcgregor DO [facility]        Today's Diagnoses     Carpal tunnel syndrome, bilateral    -  1       Follow-ups after your visit        Your next 10 appointments already scheduled     Oct 01, 2018  4:40 PM CDT   PHYSICAL with CESAR Raza   [facility]    [address] [place] [id number] [phone]              Who to contact     If you have questions or need follow up information about today's clinic visit or your schedule please contact [facility] directly at [phone].  Normal or non-critical lab and imaging results will be communicated to you by MyChart, letter or phone within 4 business days after the clinic has received the results. If you do not hear from us within 7 days, please contact the clinic through MyChart or phone. If you have a critical or abnormal lab result, we will notify you by phone as soon as possible.  Submit refill requests through [employer] or call your pharmacy and they will forward the refill request to us. Please allow 3 business days for your refill to be completed.          Additional Information About Your Visit        MyChart Information     [employer] gives you secure access to your electronic health record. If you see a primary care provider, you can also send messages to your care team and make appointments. If you have questions, please call your primary care clinic.  If you do not have a primary care provider, please call [phone] and they will assist you.        Care EveryWhere ID     This is your Care EveryWhere ID. This could be used by other organizations to access your [facility]  "records  [id number]        Your Vitals Were     Height BMI (Body Mass Index)                5' 4\" (1.626 m) 47.72 kg/m2           Blood Pressure from Last 3 Encounters:   09/28/18 138/86   09/19/18 (!) 158/126   08/06/18 (!) 163/133    Weight from Last 3 Encounters:   09/28/18 278 lb (126.1 kg)   09/19/18 278 lb 3.2 oz (126.2 kg)   08/06/18 283 lb (128.4 kg)              We Performed the Following     Hand / Upper Extremity Injection/Arthrocentesis        Primary Care Provider Office Phone # Fax #    Tika Masters [phone]       [address] [address]        Equal Access to Services     [facility] BROOKS : Hadii aad ku hadasho Soanil, waaxda luqadaha, qaybta kaalmada adeziayasushila, samir mukherjee . So [facility] [phone].    ATENCIÓN: Si habla español, tiene a hernandez disposición servicios gratuitos de asistencia lingüística. Carlos al [phone].    We comply with applicable federal civil rights laws and [place] laws. We do not discriminate on the basis of race, color, national origin, age, disability, sex, sexual orientation, or gender identity.            Thank you!     Thank you for choosing [facility]  for your care. Our goal is always to provide you with excellent care. Hearing back from our patients is one way we can continue to improve our services. Please take a few minutes to complete the written survey that you may receive in the mail after your visit with us. Thank you!             Your Updated Medication List - Protect others around you: Learn how to safely use, store and throw away your medicines at www.disposemymeds.org.          This list is accurate as of 9/28/18  5:35 PM.  Always use your most recent med list.                   Brand Name Dispense Instructions for use Diagnosis    cyclobenzaprine 10 MG tablet    FLEXERIL    30 tablet    Take 0.5-1 tablets (5-10 mg) by mouth 3 times " daily as needed for muscle spasms    Left-sided low back pain without sciatica       predniSONE 20 MG tablet    DELTASONE    18 tablet    Take 3 tabs (60 mg) by mouth daily x 3 days, 2 tabs (40 mg) daily x 3 days, 1 tab (20 mg) daily x 3 days    Bilateral carpal tunnel syndrome

## 2018-09-28 NOTE — LETTER
2018         RE: Lauri Ma  [id number] [facility] [address]        Dear Colleague,    Thank you for referring your patient, Lauri Ma, to the [facility] [place]. Please see a copy of my visit note below.    Lauri Ma  :  1995  DOS: 2018  MRN: [id number]    Sports Medicine Clinic Procedure    Ultrasound Guided bilateral Carpal Tunnel Injection    Clinical History: Gradual onset of bilateral hand numbness/tingling over last 5+ years that has worsened over the last 6 months.  Oral prednisone provided good, but not last relief.  Carpal tunnel syndrome noted on EMG.    Diagnosis:   1. Carpal tunnel syndrome, bilateral       Referring Physician: Ravinder Wright MD; DUANE Fish MD  Hand / Upper Extremity Injection/Arthrocentesis  Date/Time: 2018 4:45 PM  Performed by: DAVID WATSON  Authorized by: DAVID WATSON     Indications:  Therapeutic and diagnostic  Needle Size:  25 G  Guidance: ultrasound    Condition: carpal tunnel    Laterality:  Bilateral  Site:  Bilateral carpal tunnel  Medications (Right):  2 mL lidocaine 1 %; 40 mg triamcinolone acetonide 40 MG/ML  Medications (Left):  2 mL lidocaine 1 %; 40 mg triamcinolone acetonide 40 MG/ML  Outcome:  Tolerated well, no immediate complications  Procedure discussed: discussed risks, benefits, and alternatives    Consent Given by:  Patient  Prep: patient was prepped and draped in usual sterile fashion     Hydrodissection of median nerve in carpal tunnel performed, with fenestration of transverse carpal ligament  Bifurcated median nerve noted bilaterally, right>>>left.          Impression:  Successful bilateral carpal tunnel injection    Plan:  Follow up with Dr Fish/Adriana as directed  Expectations and goals of CSI reviewed  Often 2-3 days for steroid effect, and can take up to two weeks for maximum effect  We discussed modified progressive pain-free activity as tolerated  Do not  overuse in first two weeks if feeling better due to concern for vulnerability while steroid is working  Supportive care reviewed  All questions were answered today  Contact us with additional questions or concerns  Signs and sx of concern reviewed      Sean Mcgregor DO, CAQ  Primary Care Sports Medicine  [facility]       Again, thank you for allowing me to participate in the care of your patient.        Sincerely,        Sean Mcgregor DO

## 2018-09-28 NOTE — PROGRESS NOTES
Lauri Ma  :  1995  DOS: 2018  MRN: [id number]    Sports Medicine Clinic Procedure    Ultrasound Guided bilateral Carpal Tunnel Injection    Clinical History: Gradual onset of bilateral hand numbness/tingling over last 5+ years that has worsened over the last 6 months.  Oral prednisone provided good, but not last relief.  Carpal tunnel syndrome noted on EMG.    Diagnosis:   1. Carpal tunnel syndrome, bilateral       Referring Physician: Ravinder Wright MD; DUANE Fish MD  Hand / Upper Extremity Injection/Arthrocentesis  Date/Time: 2018 4:45 PM  Performed by: DAVID WATSON  Authorized by: DAVID WATSON     Indications:  Therapeutic and diagnostic  Needle Size:  25 G  Guidance: ultrasound    Condition: carpal tunnel    Laterality:  Bilateral  Site:  Bilateral carpal tunnel  Medications (Right):  2 mL lidocaine 1 %; 40 mg triamcinolone acetonide 40 MG/ML  Medications (Left):  2 mL lidocaine 1 %; 40 mg triamcinolone acetonide 40 MG/ML  Outcome:  Tolerated well, no immediate complications  Procedure discussed: discussed risks, benefits, and alternatives    Consent Given by:  Patient  Prep: patient was prepped and draped in usual sterile fashion     Hydrodissection of median nerve in carpal tunnel performed, with fenestration of transverse carpal ligament  Bifurcated median nerve noted bilaterally, right>>>left.          Impression:  Successful bilateral carpal tunnel injection    Plan:  Follow up with Dr Fish/Adriana as directed  Expectations and goals of CSI reviewed  Often 2-3 days for steroid effect, and can take up to two weeks for maximum effect  We discussed modified progressive pain-free activity as tolerated  Do not overuse in first two weeks if feeling better due to concern for vulnerability while steroid is working  Supportive care reviewed  All questions were answered today  Contact us with additional questions or concerns  Signs and sx of concern  reviewed      Sean Mcgregor DO, CAQ  Primary Care Sports Medicine  [facility]

## 2019-02-25 ENCOUNTER — OFFICE VISIT (OUTPATIENT)
Dept: FAMILY MEDICINE | Facility: CLINIC | Age: 24
End: 2019-02-25
Payer: COMMERCIAL

## 2019-02-25 VITALS
HEIGHT: 64 IN | OXYGEN SATURATION: 97 % | HEART RATE: 90 BPM | WEIGHT: 278 LBS | BODY MASS INDEX: 47.46 KG/M2 | TEMPERATURE: 97.6 F | DIASTOLIC BLOOD PRESSURE: 132 MMHG | SYSTOLIC BLOOD PRESSURE: 186 MMHG

## 2019-02-25 DIAGNOSIS — Z13.1 SCREENING FOR DIABETES MELLITUS: ICD-10-CM

## 2019-02-25 DIAGNOSIS — M54.2 CERVICALGIA: Primary | ICD-10-CM

## 2019-02-25 DIAGNOSIS — Z11.4 SCREENING FOR HIV (HUMAN IMMUNODEFICIENCY VIRUS): ICD-10-CM

## 2019-02-25 DIAGNOSIS — Z13.6 CARDIOVASCULAR SCREENING; LDL GOAL LESS THAN 130: ICD-10-CM

## 2019-02-25 DIAGNOSIS — Z23 NEED FOR PROPHYLACTIC VACCINATION AND INOCULATION AGAINST INFLUENZA: ICD-10-CM

## 2019-02-25 DIAGNOSIS — I16.0 ASYMPTOMATIC HYPERTENSIVE URGENCY: ICD-10-CM

## 2019-02-25 DIAGNOSIS — Z23 NEED FOR TDAP VACCINATION: ICD-10-CM

## 2019-02-25 DIAGNOSIS — L83 ACANTHOSIS NIGRICANS, ACQUIRED: ICD-10-CM

## 2019-02-25 LAB
ALBUMIN SERPL-MCNC: 4.3 G/DL (ref 3.4–5)
ALBUMIN UR-MCNC: 30 MG/DL
ALP SERPL-CCNC: 118 U/L (ref 40–150)
ALT SERPL W P-5'-P-CCNC: 78 U/L (ref 0–70)
ANION GAP SERPL CALCULATED.3IONS-SCNC: 7 MMOL/L (ref 3–14)
APPEARANCE UR: CLEAR
AST SERPL W P-5'-P-CCNC: 31 U/L (ref 0–45)
BILIRUB SERPL-MCNC: 0.7 MG/DL (ref 0.2–1.3)
BILIRUB UR QL STRIP: NEGATIVE
BUN SERPL-MCNC: 16 MG/DL (ref 7–30)
CALCIUM SERPL-MCNC: 8.9 MG/DL (ref 8.5–10.1)
CHLORIDE SERPL-SCNC: 106 MMOL/L (ref 94–109)
CHOLEST SERPL-MCNC: 179 MG/DL
CO2 SERPL-SCNC: 27 MMOL/L (ref 20–32)
COLOR UR AUTO: YELLOW
CREAT SERPL-MCNC: 0.9 MG/DL (ref 0.66–1.25)
CREAT UR-MCNC: 270 MG/DL
ERYTHROCYTE [DISTWIDTH] IN BLOOD BY AUTOMATED COUNT: 13.3 % (ref 10–15)
GFR SERPL CREATININE-BSD FRML MDRD: >90 ML/MIN/{1.73_M2}
GLUCOSE SERPL-MCNC: 87 MG/DL (ref 70–99)
GLUCOSE UR STRIP-MCNC: NEGATIVE MG/DL
HBA1C MFR BLD: 5.7 % (ref 0–5.6)
HCT VFR BLD AUTO: 49.7 % (ref 40–53)
HDLC SERPL-MCNC: 52 MG/DL
HGB BLD-MCNC: 16.9 G/DL (ref 13.3–17.7)
HGB UR QL STRIP: ABNORMAL
KETONES UR STRIP-MCNC: NEGATIVE MG/DL
LDLC SERPL CALC-MCNC: 110 MG/DL
LEUKOCYTE ESTERASE UR QL STRIP: NEGATIVE
MCH RBC QN AUTO: 29.2 PG (ref 26.5–33)
MCHC RBC AUTO-ENTMCNC: 34 G/DL (ref 31.5–36.5)
MCV RBC AUTO: 86 FL (ref 78–100)
MICROALBUMIN UR-MCNC: 126 MG/L
MICROALBUMIN/CREAT UR: 46.67 MG/G CR (ref 0–17)
MUCOUS THREADS #/AREA URNS LPF: PRESENT /LPF
NITRATE UR QL: NEGATIVE
NONHDLC SERPL-MCNC: 127 MG/DL
PH UR STRIP: 5.5 PH (ref 5–7)
PLATELET # BLD AUTO: 249 10E9/L (ref 150–450)
POTASSIUM SERPL-SCNC: 3.7 MMOL/L (ref 3.4–5.3)
PROT SERPL-MCNC: 8.4 G/DL (ref 6.8–8.8)
RBC # BLD AUTO: 5.79 10E12/L (ref 4.4–5.9)
RBC #/AREA URNS AUTO: ABNORMAL /HPF
SODIUM SERPL-SCNC: 140 MMOL/L (ref 133–144)
SOURCE: ABNORMAL
SP GR UR STRIP: 1.01 (ref 1–1.03)
TRIGL SERPL-MCNC: 84 MG/DL
TSH SERPL DL<=0.005 MIU/L-ACNC: 2.71 MU/L (ref 0.4–4)
UROBILINOGEN UR STRIP-ACNC: 0.2 EU/DL (ref 0.2–1)
WBC # BLD AUTO: 8.5 10E9/L (ref 4–11)
WBC #/AREA URNS AUTO: ABNORMAL /HPF

## 2019-02-25 PROCEDURE — 90471 IMMUNIZATION ADMIN: CPT | Performed by: NURSE PRACTITIONER

## 2019-02-25 PROCEDURE — 99215 OFFICE O/P EST HI 40 MIN: CPT | Performed by: NURSE PRACTITIONER

## 2019-02-25 PROCEDURE — 81001 URINALYSIS AUTO W/SCOPE: CPT | Performed by: NURSE PRACTITIONER

## 2019-02-25 PROCEDURE — 93000 ELECTROCARDIOGRAM COMPLETE: CPT | Performed by: NURSE PRACTITIONER

## 2019-02-25 PROCEDURE — 83036 HEMOGLOBIN GLYCOSYLATED A1C: CPT | Performed by: NURSE PRACTITIONER

## 2019-02-25 PROCEDURE — 90686 IIV4 VACC NO PRSV 0.5 ML IM: CPT | Performed by: NURSE PRACTITIONER

## 2019-02-25 PROCEDURE — 87389 HIV-1 AG W/HIV-1&-2 AB AG IA: CPT | Performed by: NURSE PRACTITIONER

## 2019-02-25 PROCEDURE — 85027 COMPLETE CBC AUTOMATED: CPT | Performed by: NURSE PRACTITIONER

## 2019-02-25 PROCEDURE — 36415 COLL VENOUS BLD VENIPUNCTURE: CPT | Performed by: NURSE PRACTITIONER

## 2019-02-25 PROCEDURE — 84443 ASSAY THYROID STIM HORMONE: CPT | Performed by: NURSE PRACTITIONER

## 2019-02-25 PROCEDURE — 82043 UR ALBUMIN QUANTITATIVE: CPT | Performed by: NURSE PRACTITIONER

## 2019-02-25 PROCEDURE — 80053 COMPREHEN METABOLIC PANEL: CPT | Performed by: NURSE PRACTITIONER

## 2019-02-25 PROCEDURE — 80061 LIPID PANEL: CPT | Performed by: NURSE PRACTITIONER

## 2019-02-25 PROCEDURE — 90715 TDAP VACCINE 7 YRS/> IM: CPT | Performed by: NURSE PRACTITIONER

## 2019-02-25 PROCEDURE — 90472 IMMUNIZATION ADMIN EACH ADD: CPT | Performed by: NURSE PRACTITIONER

## 2019-02-25 RX ORDER — LISINOPRIL AND HYDROCHLOROTHIAZIDE 12.5; 2 MG/1; MG/1
1 TABLET ORAL DAILY
Qty: 90 TABLET | Refills: 3 | Status: SHIPPED | OUTPATIENT
Start: 2019-02-25 | End: 2019-02-28

## 2019-02-25 RX ORDER — IBUPROFEN 600 MG/1
600 TABLET, FILM COATED ORAL EVERY 6 HOURS PRN
Qty: 30 TABLET | Refills: 1 | Status: SHIPPED | OUTPATIENT
Start: 2019-02-25 | End: 2020-02-25

## 2019-02-25 RX ORDER — CYCLOBENZAPRINE HCL 10 MG
10 TABLET ORAL 3 TIMES DAILY PRN
Qty: 21 TABLET | Refills: 0 | Status: SHIPPED | OUTPATIENT
Start: 2019-02-25 | End: 2019-03-14

## 2019-02-25 ASSESSMENT — MIFFLIN-ST. JEOR: SCORE: 2157

## 2019-02-25 NOTE — LETTER
[address] [address]  Phone: [phone]    February 25, 2019        Lauri Ma  [address] [facility] [place] [address]          To whom it may concern:    RE: Lauri Ma    Patient was seen and treated today at our clinic and missed work.  He will follow back in clinic on Thursday 2/28/19 and should not work until he has been cleared at that visit.     Please contact me for questions or concerns.      Sincerely,        VALERIE Kaur CNP

## 2019-02-25 NOTE — PROGRESS NOTES

## 2019-02-26 LAB — HIV 1+2 AB+HIV1 P24 AG SERPL QL IA: NONREACTIVE

## 2019-02-28 ENCOUNTER — OFFICE VISIT (OUTPATIENT)
Dept: FAMILY MEDICINE | Facility: CLINIC | Age: 24
End: 2019-02-28
Payer: COMMERCIAL

## 2019-02-28 VITALS
TEMPERATURE: 98 F | WEIGHT: 278 LBS | BODY MASS INDEX: 47.46 KG/M2 | HEIGHT: 64 IN | DIASTOLIC BLOOD PRESSURE: 104 MMHG | SYSTOLIC BLOOD PRESSURE: 153 MMHG | OXYGEN SATURATION: 98 % | HEART RATE: 88 BPM

## 2019-02-28 DIAGNOSIS — M54.2 CERVICALGIA: ICD-10-CM

## 2019-02-28 DIAGNOSIS — E66.01 MORBID OBESITY (H): ICD-10-CM

## 2019-02-28 DIAGNOSIS — I10 ESSENTIAL HYPERTENSION: Primary | ICD-10-CM

## 2019-02-28 PROCEDURE — 99214 OFFICE O/P EST MOD 30 MIN: CPT | Performed by: NURSE PRACTITIONER

## 2019-02-28 RX ORDER — LISINOPRIL AND HYDROCHLOROTHIAZIDE 12.5; 2 MG/1; MG/1
2 TABLET ORAL DAILY
Qty: 90 TABLET | Refills: 3 | Status: SHIPPED | OUTPATIENT
Start: 2019-02-28 | End: 2019-03-14 | Stop reason: DRUGHIGH

## 2019-02-28 ASSESSMENT — MIFFLIN-ST. JEOR: SCORE: 2157

## 2019-02-28 NOTE — LETTER
[address] [address]  Phone: [phone]    February 28, 2019        Lauri Ma  [address] [facility] [address]          To whom it may concern:    RE: Lauri Ma    Patient was seen and treated today at our clinic and missed work.  Patient may return to work tomorrow with the following:  Light duty-unable to bend, stoop or twist.  He can return to work without restrictions on Monday 3/4/19.    Please contact me for questions or concerns.      Sincerely,        VALERIE Kaur CNP

## 2019-02-28 NOTE — PROGRESS NOTES
SUBJECTIVE:   Lauri Ma is a 23 year old male who presents to clinic today for the following health issues:      Neck Pain      Duration: since friday    Description:  Location: right side   Radiation: into the right shoulder    Intensity:  moderate, 4/10    Accompanying signs and symptoms:     History (similar episodes/previous evaluation): None    Precipitating or alleviating factors: None    Therapies tried and outcome: ibuprofen and flexeril      Hypertension Follow-up      Outpatient blood pressures are not being checked.    Low Salt Diet: no added salt      Problem list and histories reviewed & adjusted, as indicated.  Additional history: as documented    Patient Active Problem List   Diagnosis     CARDIOVASCULAR SCREENING; LDL GOAL LESS THAN 130     Acromioclavicular joint separation     Morbid obesity (H)     Cervicalgia     Asymptomatic hypertensive urgency     Acanthosis nigricans, acquired     Essential hypertension     Past Surgical History:   Procedure Laterality Date     NO HISTORY OF SURGERY         Social History     Tobacco Use     Smoking status: Never Smoker     Smokeless tobacco: Never Used   Substance Use Topics     Alcohol use: Yes     Comment: rarely     Family History   Problem Relation Age of Onset     Hypertension Mother      Hypertension Father      Cancer Maternal Grandmother      Cerebrovascular Disease Maternal Grandfather      Unknown/Adopted Paternal Grandmother      Unknown/Adopted Paternal Grandfather      Diabetes No family hx of      Hyperlipidemia No family hx of      Colon Cancer No family hx of      Prostate Cancer No family hx of      Anxiety Disorder No family hx of      Depression No family hx of      Asthma No family hx of      Thyroid Disease No family hx of          Current Outpatient Medications   Medication Sig Dispense Refill     cyclobenzaprine (FLEXERIL) 10 MG tablet Take 1 tablet (10 mg) by mouth 3 times daily as needed for muscle spasms 21 tablet 0     ibuprofen  "(ADVIL/MOTRIN) 600 MG tablet Take 1 tablet (600 mg) by mouth every 6 hours as needed for moderate pain 30 tablet 1     lisinopril-hydrochlorothiazide (PRINZIDE/ZESTORETIC) 20-12.5 MG tablet Take 1 tablet by mouth daily 90 tablet 3     order for DME Equipment being ordered: Digital home blood pressure monitor kit 1 Device 0     BP Readings from Last 3 Encounters:   02/28/19 (!) 153/104   02/25/19 (!) 186/132   09/28/18 138/86    Wt Readings from Last 3 Encounters:   02/28/19 126.1 kg (278 lb)   02/25/19 126.1 kg (278 lb)   09/28/18 126.1 kg (278 lb)                    Reviewed and updated as needed this visit by clinical staff  Tobacco  Allergies  Meds  Med Hx  Surg Hx  Fam Hx  Soc Hx      Reviewed and updated as needed this visit by Provider         ROS:  Constitutional, HEENT, cardiovascular, pulmonary, gi and gu systems are negative, except as otherwise noted.    OBJECTIVE:     BP (!) 153/104   Pulse 88   Temp 98  F (36.7  C) (Oral)   Ht 1.626 m (5' 4\")   Wt 126.1 kg (278 lb)   SpO2 98%   BMI 47.72 kg/m    Body mass index is 47.72 kg/m .  GENERAL: healthy, alert and no distress  EYES: Eyes grossly normal to inspection, PERRL and conjunctivae and sclerae normal  HENT: ear canals and TM's normal, nose and mouth without ulcers or lesions  NECK: no adenopathy, no asymmetry, masses, or scars and thyroid normal to palpation  RESP: lungs clear to auscultation - no rales, rhonchi or wheezes  CV: regular rate and rhythm, normal S1 S2, no S3 or S4, no murmur, click or rub, no peripheral edema and peripheral pulses strong  ABDOMEN: soft, nontender, no hepatosplenomegaly, no masses and bowel sounds normal  MS: no gross musculoskeletal defects noted, no edema  SKIN: no suspicious lesions or rashes  NEURO: Normal strength and tone, mentation intact and speech normal  BACK: no CVA tenderness, no paralumbar tenderness  PSYCH: mentation appears normal, affect normal/bright  LYMPH: normal ant/post cervical, " "supraclavicular nodes    Diagnostic Test Results:  none     ASSESSMENT/PLAN:           BMI:   Estimated body mass index is 47.72 kg/m  as calculated from the following:    Height as of this encounter: 1.626 m (5' 4\").    Weight as of this encounter: 126.1 kg (278 lb).   Weight management plan: Discussed healthy diet and exercise guidelines      1. Essential hypertension  Increase Zestoretic to 40-24mg.  Follow back 2 weeks, reviewed hidden salt in the diet, need to cut back on salt, increase water consumption, benefits of weight loss/regular exercise.  Patient to bring his BP cuff to next visit so we can compare readings.  - order for DME; Equipment being ordered: Digital home blood pressure monitor kit  Dispense: 1 Device; Refill: 0  - **Basic metabolic panel FUTURE anytime; Future    2. Cervicalgia   Neck Sprain and Strain  (primary encounter diagnosis)    Discussed the general treatment measures which include:  -Ice/Heat (patient not to fall asleep with heat pad on).  -No pillow; avoiding hyperflexed posture of the neck.  -Med including Tylenol, NSAIDs, muscle relaxants, and the role of narcotic meds in this setting  See [facility] for orders  -Neck exercises (patient education materials given)  -Aerobic exercise program   -PT if not improving    3. Morbid obesity (H)  Benefits of weight loss reviewed in detail, encouraged him to cut back on the carbohydrates in the diet, consume more fruits and vegetables, drink plenty of water, avoid fruit juices, sodas, get 150 min moderate exercise/week.  Recheck weight in 6 months.        Work on weight loss  Regular exercise  See Patient Instructions    VALERIE Kaur [facility]  "

## 2019-02-28 NOTE — PATIENT INSTRUCTIONS
At [facility], we strive to deliver an exceptional experience to you, every time we see you.  If you receive a survey in the mail, please send us back your thoughts. We really do value your feedback.    Your care team:                            Family Medicine Internal Medicine   MD Leonides Sandy MD Shantel Branch-Fleming, MD Katya Georgiev PA-C Megan Hill, APRN CNP    Braxton Calabrese, MD Pediatrics   Eugenio Quiroga, ERVIN Chandler, MD Aaliyah Guzman APRN CNP   MD Ave Huang MD Deborah Mielke, MD Cat Zelaya, APRN [facility]      Clinic hours: Monday - Thursday 7 am-7 pm; Fridays 7 am-5 pm.   Urgent care: Monday - Friday 11 am-9 pm; Saturday and Sunday 9 am-5 pm.  Pharmacy : Monday -Thursday 8 am-8 pm; Friday 8 am-6 pm; Saturday and Sunday 9 am-5 pm.     Clinic: [phone]   Pharmacy: [phone]        Patient Education     Uncontrolled High Blood Pressure (Established)    Your blood pressure was unusually high today. This can occur if you ve missed doses of your blood pressure medicine. Or it can happen if you are taking other medicines. These include some asthma inhalers, decongestants, diet pills, and street drugs like cocaine and amphetamine.  Other causes include:    Weight gain    More salt in your diet    Smoking    Caffeine  Your blood pressure can also rise if you are emotionally upset or in intense pain. It may go back to normal after a period of rest.  Blood pressure measurements are given as 2 numbers. Systolic blood pressure is the upper number. This is the pressure when the heart contracts. Diastolic blood pressure is the lower number. This is the pressure when the heart relaxes between beats. You will see your blood pressure readings written together. For example, a person with a systolic pressure of 118 and a diastolic pressure of 78 will have 118/78 written in the medical record. To be high blood pressure, the  numbers must be higher when tested over a period of time.  Blood pressure is categorized as normal, elevated, or stage 1 or stage 2 high blood pressure:    Normal blood pressure is systolic of less than 120 and diastolic of less than 80 (120/80)    Elevated blood pressure is systolic of 120 to 129 and diastolic less than 80    Stage 1 high blood pressure is systolic is 130 to 139 or diastolic between 80 to 89    Stage 2 high blood pressure is when systolic is 140 or higher or the diastolic is 90 or higher  Uncontrolled high blood pressure can cause serious health problems. It raises your risk for heart attack, stroke, and heart failure. In general, if you have high blood pressure, keeping your blood pressure below 130/80 mmHg may help prevent these problems. Your healthcare provider may prescribe medicine to help control blood pressure if lifestyle changes are not enough.  Home care  It s important to take steps to lower your blood pressure. If you are taking blood pressure medicine, the guidelines below may help you need less or no medicines in the future.    Start a weight-loss program if you are overweight.    Cut back on the amount of salt in your diet:  ? Avoid high-salt foods like olives, pickles, smoked meats, and salted potato chips.  ? Don t add salt to your food at the table.  ? Use only small amounts of salt when cooking.    Start an exercise program. Talk with your healthcare provider about what exercise program is best for you. It doesn t have to be difficult. Even brisk walking for 20 minutes 3 times a week is a good form of exercise.    Avoid medicines that stimulates the heart. This includes many over-the-counter cold and sinus decongestant pills and sprays, as well as diet pills. Check the warnings about high blood pressure on the label. Before purchasing any over-the-counter medicines or supplements, always ask the pharmacist about the product's potential interaction with your high blood pressure  and your medicines.    Stimulants such as amphetamine or cocaine could be lethal for someone with high blood pressure. Never take these.    Limit how much caffeine you drink. Or switch to noncaffeinated beverages.    Stop smoking. If you are a long-time smoker, this can be hard. Enroll in a stop-smoking program to make it more likely that you will succeed. Talk with your provider about ways to quit.    Learn how to handle stress better. This is an important part of any program to lower blood pressure. Learn ways to relax. These include meditation, yoga, and biofeedback.    If medicines were prescribed, take them exactly as directed. Missing doses may cause your blood pressure to get out of control.    If you miss a dose or doses of your medicines, check with your healthcare provider or pharmacist about what to do.    Consider buying an automatic blood pressure machine. Your provider may recommend a certain type. You can get one of these at most pharmacies. Measure your blood pressure twice a day, in the morning, and in the late afternoon. Keep a written record of your home blood pressure readings and take the record to your medical appointments.  Here are some additional guidelines on home blood pressure monitoring from the American Heart Association.    Don't smoke or drink coffee for 30 minutes    Go to the bathroom before the test.    Relax for 5 minutes before taking the measurement.    Sit correctly. Be sure your back is supported. Don't sit on a couch or soft chair. Uncross your feet and place them flat on the floor. Place your arm on a solid, flat surface like a table with the upper arm at heart level. Make certain the middle of the cuff is directly above the bend of the elbow. Check the monitor's instruction manual for an illustration.    Take multiple readings. When you measure, take 2 or 3 readings one minute apart and record all of the results.    Take your blood pressure at the same time every day, or as  your healthcare provider recommends.    Record the date, time, and blood pressure reading.    Take the record with you to your next appointment. If your blood pressure monitor has a built-in memory, simply take the monitor with you to your next appointment.    Call your provider if you have several high readings. Don't be frightened by a single high reading, but if you get several high readings, check in with your healthcare provider.    Note: When blood pressure reaches a systolic (top number) of 180 or higher or a diastolic (bottom number) of 110 or higher, emergency medical treatment is required. Call your healthcare provider immediately.  Follow-up care  Regular visits to your own healthcare provider for blood pressure and medicine checks are an important part of your care. Make a follow-up appointment as directed. Bring the record of your home blood pressure readings to the appointment.  When to seek medical advice  Call your healthcare provider right away if any of these occur:    Blood pressure reaches a systolic (top number) of 180 or higher or diastolic (bottom number) of 110 or higher, emergency medical treatment is required.    Chest, arm, shoulder, neck, or upper back pain    Shortness of breath    Severe headache    Throbbing or rushing sound in the ears    Nosebleed    Extreme drowsiness, confusion, or fainting    Dizziness or dizziness with spinning sensation (vertigo)    Weakness in an arm or leg or on one side of the face    Trouble speaking or seeing   Date Last Reviewed: 1/1/2017 2000-2018 [employer]. [address] [place] [address]. All rights reserved. This information is not intended as a substitute for professional medical care. Always follow your healthcare professional's instructions.

## 2019-03-01 ENCOUNTER — TELEPHONE (OUTPATIENT)
Dept: FAMILY MEDICINE | Facility: CLINIC | Age: 24
End: 2019-03-01

## 2019-03-01 NOTE — TELEPHONE ENCOUNTER
BP Readings from Last 3 Encounters:   02/28/19 (!) 153/104   02/25/19 (!) 186/132   09/28/18 138/86       Lauri Ma is a 23 year old male who calls with blurry vision.    NURSING ASSESSMENT:  Description:  Only when reads  Onset/duration:  Started last night   Precip. factors:  none  Associated symptoms:  None, denies headache, chest pain, SOB  Improves/worsens symptoms:  none  Pain scale (0-10)   0/10  Pupils are the same size, no recent injury to the eye  Patient just checked /106  Allergies:   Allergies   Allergen Reactions     Shrimp Anaphylaxis and Swelling     Shellfish-Derived Products      Other reaction(s): Angioedema       MEDICATIONS:   Taking medication(s) as prescribed? Yes  Taking over the counter medication(s?) No  Any medication side effects? Not Applicable    Any barriers to taking medication(s) as prescribed?  No  Medication(s) improving/managing symptoms?  Yes  Medication reconciliation completed: No      NURSING PLAN: Nursing advice to patient go to nearest  today.    RECOMMENDED DISPOSITION:    Will comply with recommendation: Yes  If further questions/concerns or if symptoms do not improve, worsen or new symptoms develop, call your PCP or [facility] as soon as possible.      Guideline used:  Telephone Triage Protocols for Nurses, Fifth Edition, Deborah Kline RN

## 2019-03-01 NOTE — TELEPHONE ENCOUNTER
Reason for call:  Patient reporting a symptom    Symptom or request: blurry vision when reading- he started taking a new medication cyclobenzaprine (FLEXERIL) 10 MG tablet     He wants to know if the medication is causing him to have blurry vision when concentrating on reading.    Duration (how long have symptoms been present): Yesterday 2/28/2019    Have you been treated for this before? Not sure    Additional comments:     Phone Number patient can be reached at:  Cell number on file:    Telephone Information:   Mobile [phone]       Best Time:  any    Can we leave a detailed message on this number:  YES    Call taken on 3/1/2019 at 4:56 PM by Kenny Og

## 2019-03-07 NOTE — TELEPHONE ENCOUNTER
Patients Cozaar was just increased.   He can come in this week and have his BP rechecked.  His BP is actually coming down.  Rocío PADILLA, CNP

## 2019-03-14 ENCOUNTER — OFFICE VISIT (OUTPATIENT)
Dept: FAMILY MEDICINE | Facility: CLINIC | Age: 24
End: 2019-03-14
Payer: COMMERCIAL

## 2019-03-14 VITALS
HEART RATE: 85 BPM | OXYGEN SATURATION: 97 % | TEMPERATURE: 97.2 F | HEIGHT: 64 IN | SYSTOLIC BLOOD PRESSURE: 130 MMHG | WEIGHT: 269.2 LBS | BODY MASS INDEX: 45.96 KG/M2 | DIASTOLIC BLOOD PRESSURE: 92 MMHG

## 2019-03-14 DIAGNOSIS — I10 ESSENTIAL HYPERTENSION: ICD-10-CM

## 2019-03-14 DIAGNOSIS — E66.01 MORBID OBESITY (H): ICD-10-CM

## 2019-03-14 DIAGNOSIS — I10 ESSENTIAL HYPERTENSION: Primary | ICD-10-CM

## 2019-03-14 LAB
ANION GAP SERPL CALCULATED.3IONS-SCNC: 9 MMOL/L (ref 6–17)
BUN SERPL-MCNC: 14 MG/DL (ref 7–30)
CALCIUM SERPL-MCNC: 9.8 MG/DL (ref 8.5–10.1)
CHLORIDE SERPL-SCNC: 97 MMOL/L (ref 94–109)
CO2 SERPL-SCNC: 29 MMOL/L (ref 20–32)
CREAT SERPL-MCNC: 1 MG/DL (ref 0.66–1.25)
GFR SERPL CREATININE-BSD FRML MDRD: >90 ML/MIN/{1.73_M2}
GLUCOSE SERPL-MCNC: 92 MG/DL (ref 70–99)
POTASSIUM SERPL-SCNC: 3.7 MMOL/L (ref 3.4–5.3)
SODIUM SERPL-SCNC: 135 MMOL/L (ref 133–144)

## 2019-03-14 PROCEDURE — 36415 COLL VENOUS BLD VENIPUNCTURE: CPT | Performed by: NURSE PRACTITIONER

## 2019-03-14 PROCEDURE — 99213 OFFICE O/P EST LOW 20 MIN: CPT | Performed by: NURSE PRACTITIONER

## 2019-03-14 PROCEDURE — 80048 BASIC METABOLIC PNL TOTAL CA: CPT | Performed by: NURSE PRACTITIONER

## 2019-03-14 RX ORDER — LISINOPRIL AND HYDROCHLOROTHIAZIDE 12.5; 2 MG/1; MG/1
1 TABLET ORAL DAILY
Qty: 180 TABLET | Refills: 3 | Status: SHIPPED | OUTPATIENT
Start: 2019-03-14 | End: 2021-05-13

## 2019-03-14 ASSESSMENT — MIFFLIN-ST. JEOR: SCORE: 2117.08

## 2019-03-14 NOTE — PROGRESS NOTES
SUBJECTIVE:   Lauri Ma is a 23 year old male who presents to clinic today for the following health issues:      Hypertension Follow-up      Outpatient blood pressures are being checked at home.  Results are 148/98.    Low Salt Diet: low salt      Amount of exercise or physical activity: 4-5 days/week for an average of greater than 60 minutes    Problems taking medications regularly: No    Medication side effects: fatigue, nausea and loose stools    Diet: low salt  He has been taking Zestoretic 20-12.5 mg instead of 40-25 mg as was ordered at last visit.        Problem list and histories reviewed & adjusted, as indicated.  Additional history: as documented    Patient Active Problem List   Diagnosis     CARDIOVASCULAR SCREENING; LDL GOAL LESS THAN 130     Acromioclavicular joint separation     Morbid obesity (H)     Cervicalgia     Asymptomatic hypertensive urgency     Acanthosis nigricans, acquired     Essential hypertension     Past Surgical History:   Procedure Laterality Date     NO HISTORY OF SURGERY         Social History     Tobacco Use     Smoking status: Never Smoker     Smokeless tobacco: Never Used   Substance Use Topics     Alcohol use: Yes     Comment: rarely     Family History   Problem Relation Age of Onset     Hypertension Mother      Hypertension Father      Cancer Maternal Grandmother      Cerebrovascular Disease Maternal Grandfather      Unknown/Adopted Paternal Grandmother      Unknown/Adopted Paternal Grandfather      Diabetes No family hx of      Hyperlipidemia No family hx of      Colon Cancer No family hx of      Prostate Cancer No family hx of      Anxiety Disorder No family hx of      Depression No family hx of      Asthma No family hx of      Thyroid Disease No family hx of          Current Outpatient Medications   Medication Sig Dispense Refill     ibuprofen (ADVIL/MOTRIN) 600 MG tablet Take 1 tablet (600 mg) by mouth every 6 hours as needed for moderate pain 30 tablet 1      "lisinopril-hydrochlorothiazide (PRINZIDE/ZESTORETIC) 20-12.5 MG tablet Take 1 tablet by mouth daily 180 tablet 3     order for DME Equipment being ordered: Digital home blood pressure monitor kit 1 Device 0     BP Readings from Last 3 Encounters:   03/14/19 (!) 130/92   02/28/19 (!) 153/104   02/25/19 (!) 186/132    Wt Readings from Last 3 Encounters:   03/14/19 122.1 kg (269 lb 3.2 oz)   02/28/19 126.1 kg (278 lb)   02/25/19 126.1 kg (278 lb)                    Reviewed and updated as needed this visit by clinical staff  Tobacco  Allergies  Meds  Problems  Med Hx  Surg Hx  Fam Hx  Soc Hx        Reviewed and updated as needed this visit by Provider  Tobacco  Allergies  Meds  Problems  Med Hx  Surg Hx  Fam Hx  Soc Hx          ROS:  Constitutional, HEENT, cardiovascular, pulmonary, gi and gu systems are negative, except as otherwise noted.    OBJECTIVE:     BP (!) 130/92   Pulse 85   Temp 97.2  F (36.2  C) (Oral)   Ht 1.626 m (5' 4\")   Wt 122.1 kg (269 lb 3.2 oz)   SpO2 97%   BMI 46.21 kg/m    Body mass index is 46.21 kg/m .  GENERAL: healthy, alert and no distress  EYES: Eyes grossly normal to inspection, PERRL and conjunctivae and sclerae normal  HENT: ear canals and TM's normal, nose and mouth without ulcers or lesions  NECK: no adenopathy, no asymmetry, masses, or scars and thyroid normal to palpation  RESP: lungs clear to auscultation - no rales, rhonchi or wheezes  CV: regular rate and rhythm, normal S1 S2, no S3 or S4, no murmur, click or rub, no peripheral edema and peripheral pulses strong  ABDOMEN: soft, nontender, no hepatosplenomegaly, no masses and bowel sounds normal  MS: no gross musculoskeletal defects noted, no edema  SKIN: no suspicious lesions or rashes  PSYCH: mentation appears normal, affect normal/bright  LYMPH: no cervical adenopathy    Diagnostic Test Results:  none     ASSESSMENT/PLAN:         BMI:   Estimated body mass index is 46.21 kg/m  as calculated from the " "following:    Height as of this encounter: 1.626 m (5' 4\").    Weight as of this encounter: 122.1 kg (269 lb 3.2 oz).   Weight management plan: Discussed healthy diet and exercise guidelines      1. Essential hypertension  Patient is working on weight loss, low salt diet, will keep Zestoretic at current dose,  return to clinic 6 weeks for recheck BP.  - lisinopril-hydrochlorothiazide (PRINZIDE/ZESTORETIC) 20-12.5 MG tablet; Take 1 tablet by mouth daily  Dispense: 180 tablet; Refill: 3    2. Morbid obesity (H)  Congratulated him for his lifestyle changes that he's made so far, encouraged him to continue same.      Work on weight loss  Regular exercise    VALERIE Kaur [facility]  "

## 2019-03-14 NOTE — PATIENT INSTRUCTIONS
At [facility], we strive to deliver an exceptional experience to you, every time we see you.  If you receive a survey in the mail, please send us back your thoughts. We really do value your feedback.    Your care team:                            Family Medicine Internal Medicine   MD Leonides Sandy MD Shantel Branch-Fleming, MD Katya Georgiev PA-C Megan Hill, APRN CNP    Braxton Calabrese MD Pediatrics   Eugenio Quiroga, ERVIN Chandler, MD Aaliyah Guzman APRN CNP   MD Ave Huang MD Deborah Mielke, MD Cat Zelaya, APRN [facility]      Clinic hours: Monday - Thursday 7 am-7 pm; Fridays 7 am-5 pm.   Urgent care: Monday - Friday 11 am-9 pm; Saturday and Sunday 9 am-5 pm.  Pharmacy : Monday -Thursday 8 am-8 pm; Friday 8 am-6 pm; Saturday and Sunday 9 am-5 pm.     Clinic: [phone]   Pharmacy: [phone]        Patient Education     Eating Heart-Healthy Foods  Eating has a big impact on your heart health. In fact, eating healthier can improve several of your heart risks at once. For instance, it helps you manage weight, cholesterol, and blood pressure. Here are ideas to help you make heart-healthy changes without giving up all the foods and flavors you love.  Getting started    Talk with your healthcare provider about eating plans, such as the DASH or Mediterranean diet. You may also be referred to a dietitian.    Change a few things at a time. Give yourself time to get used to a few eating changes before adding more.    Work to create a tasty, healthy eating plan that you can stick to for the rest of your life.    Goals for healthy eating  Below are some tips to improve your eating habits:    Limit saturated fats and trans fats. Saturated fats raise your levels of cholesterol, so keep these fats to a minimum. They are found in foods such as fatty meats, whole milk, cheese, and palm and coconut oils. Avoid trans fats because they lower good  cholesterol as well as raise bad cholesterol. Trans fats are most often found in processed foods.    Reduce sodium (salt) intake. Eating too much salt may increase your blood pressure. Limit your sodium intake to 2,300 milligrams (mg) per day (the amount in 1 teaspoon of salt), or less if your healthcare provider recommends it. Dining out less often and eating fewer processed foods are two great ways to decrease the amount of salt you consume.    Managing calories. A calorie is a unit of energy. Your body burns calories for fuel, but if you eat more calories than your body burns, the extras are stored as fat. Your healthcare provider can help you create a diet plan to manage your calories. This will likely include eating healthier foods as well as exercising regularly. To help you track your progress, keep a diary to record what you eat and how often you exercise.  Choose the right foods  Aim to make these foods staples of your diet. If you have diabetes, you may have different recommendations than what is listed here:    Fruits and vegetables provide plenty of nutrients without a lot of calories. At meals, fill half your plate with these foods. Split the other half of your plate between whole grains and lean protein.    Whole grains are high in fiber and rich in vitamins and nutrients. Good choices include whole-wheat bread, pasta, and brown rice.    Lean proteins give you nutrition with less fat. Good choices include fish, skinless chicken, and beans.    Low-fat or nonfat dairy provides nutrients without a lot of fat. Try low-fat or nonfat milk, cheese, or yogurt.    Healthy fats can be good for you in small amounts. These are unsaturated fats, such as olive oil, nuts, and fish. Try to have at least 2 servings per week of fatty fish, such as salmon, sardines, mackerel, rainbow trout, and albacore tuna. These contain omega-3 fatty acids, which are good for your heart. Flaxseed is another source of a heart-healthy  fat.  More on heart-healthy eating  Read food labels  Healthy eating starts at the grocery store. Be sure to pay attention to food labels on packaged foods. Look for products that are high in fiber and protein, and low in saturated fat, cholesterol, and sodium. Avoid products that contain trans fat. And pay close attention to serving size. For instance, if you plan to eat two servings, double all the numbers on the label.  Prepare food right  A key part of healthy cooking is cutting down on added fat and salt. Look on the internet for lower-fat, lower-sodium recipes. Also, try these tips:    Remove fat from meat and skin from poultry before cooking.    Skim fat from the surface of soups and sauces.    Broil, boil, bake, steam, grill, and microwave food without added fats.    Choose ingredients that spice up your food without adding calories, fat, or sodium. Try these items: horseradish, hot sauce, lemon, mustard, nonfat salad dressings, and vinegar. For salt-free herbs and spices, try basil, cilantro, cinnamon, pepper, and rosemary.  Date Last Reviewed: 10/1/2017    2569-6608 [employer]. [address] [place] [address]. All rights reserved. This information is not intended as a substitute for professional medical care. Always follow your healthcare professional's instructions.           Patient Education     Risk Factors for Heart Disease    A risk factor is something that increases your chance of having heart disease. Heart disease (also called coronary artery disease) involves damage to the heart arteries. These blood vessels need to work well to provide the oxygen your heart needs to pump blood to the rest of your body. Things like smoking or high cholesterol levels can damage arteries. You can t control some risk factors, such as age and a family history of heart disease. But there are many things you can control to reduce your risk for heart disease.     Unhealthy cholesterol  "levels  Cholesterol is a fat-like substance in your blood. It can build up along the artery walls. This is called plaque. Over time, plaque narrows the arteries and reduces blood flow to your heart or brain. If a blood clot forms or a piece of the plaque breaks off, it can completely block the artery and cause a heart attack or stroke. Your risk of heart disease goes up if you have high levels of LDL (\"bad\") cholesterol or triglycerides (another fatty substance that can build up). You re also at risk if you have low HDL cholesterol (\"good\") cholesterol. HDL helps clear the bad cholesterol away. You're at risk if you have:  HDL cholesterol 50 mg/dL or lower; LDL cholesterol 100 mg/dL; or triglycerides of 150 mg/dL or higher.  Smoking  This is the most important risk factor you can change. Smoking causes inflammation and damages the smooth muscle that lines the arteries making them less flexible. It also raises your blood pressure, causing further damage to the artery lining. Smoking also increases your risk that your blood may clot, block an artery, and cause a heart attack or stroke. Smoking also damages your lungs, which affects the delivery of oxygen to the body. If you smoke, you are 2 to 4 times more likely to develop coronary artery disease. If you smoke, it's never too late to help your heart. Ask your doctor about nicotine replacement products and smoking cessation support.  High blood pressure  High blood pressure occurs when blood pushes too hard against artery walls. This causes damage to the artery walls and the formation of scar tissue as it heals. This makes the arteries stiff and weak. Plaque sticks to the scarred tissue narrowing and hardening the arteries. High blood pressure also causes your heart to work harder to get blood out to the body. High blood pressure raises your risk of heart attack, also known as acute myocardial infarction, or AMI, and especially stroke. The brain tissue is especially " sensitive to high blood pressure damage. You're at risk if your blood pressure is 120/80 or higher.  Negative emotions  Chronic stress, pent-up anger, and other negative emotions have been linked to heart disease. This occurs because stress increases the levels of a hormone that increase the demand on your heart. Over time, these emotions could raise your heart disease risk.  Metabolic syndrome  This is caused by a combination of certain risk factors. It puts you at extra high risk of heart disease, stroke, and diabetes. You have metabolic syndrome if you have 3 or more of the following: low HDL cholesterol; high triglycerides; high blood pressure; high blood sugar; extra weight around the waist.  Diabetes  Diabetes occurs when you have high levels of sugar (glucose) in your blood. This can damage arteries if not kept under control. Having diabetes also makes you more likely to have a silent heart attack--one without any symptoms.  Excess weight  Excess weight makes other risk factors, such as diabetes, more likely. Excess weight around the waist or stomach increases your heart disease risk the most.  Lack of physical activity  When you re not active, you re more likely to develop diabetes, high blood pressure, high cholesterol levels, and excess weight.     Most people with heart disease have more than one risk factor.   Date Last Reviewed: 3/28/2016    9275-6719 [employer]. [address] [place] [address], [place] [address]. All rights reserved. This information is not intended as a substitute for professional medical care. Always follow your healthcare professional's instructions.

## 2019-11-21 ENCOUNTER — OFFICE VISIT (OUTPATIENT)
Dept: URGENT CARE | Facility: URGENT CARE | Age: 24
End: 2019-11-21
Payer: COMMERCIAL

## 2019-11-21 VITALS
BODY MASS INDEX: 46.83 KG/M2 | SYSTOLIC BLOOD PRESSURE: 162 MMHG | TEMPERATURE: 98.6 F | OXYGEN SATURATION: 97 % | DIASTOLIC BLOOD PRESSURE: 127 MMHG | WEIGHT: 272.8 LBS | HEART RATE: 104 BPM

## 2019-11-21 DIAGNOSIS — I10 ESSENTIAL HYPERTENSION: Primary | ICD-10-CM

## 2019-11-21 PROCEDURE — 99213 OFFICE O/P EST LOW 20 MIN: CPT | Performed by: PHYSICIAN ASSISTANT

## 2019-11-21 ASSESSMENT — ENCOUNTER SYMPTOMS
EYE REDNESS: 0
ADENOPATHY: 0
RESPIRATORY NEGATIVE: 1
CHEST TIGHTNESS: 0
CHILLS: 0
WHEEZING: 0
ENDOCRINE NEGATIVE: 1
MUSCULOSKELETAL NEGATIVE: 1
DIAPHORESIS: 0
EYE DISCHARGE: 0
RHINORRHEA: 0
POLYDIPSIA: 0
EYES NEGATIVE: 1
CARDIOVASCULAR NEGATIVE: 1
COUGH: 0
ABDOMINAL PAIN: 0
PALPITATIONS: 0
EYE ITCHING: 0
MYALGIAS: 0
FEVER: 0
VOMITING: 0
SORE THROAT: 0
HEMATURIA: 0
FREQUENCY: 0
CONSTITUTIONAL NEGATIVE: 1
WEAKNESS: 0
DIARRHEA: 0
LIGHT-HEADEDNESS: 0
DIZZINESS: 0
DYSURIA: 0
SHORTNESS OF BREATH: 0
GASTROINTESTINAL NEGATIVE: 1
HEADACHES: 0
NAUSEA: 0

## 2019-11-21 NOTE — PROGRESS NOTES
Chief Complaint:    Chief Complaint   Patient presents with     Hypertension     Patient is here to discuss high blood pressure and headaches        HPI: Lauri Ma is an 24 year old male who presents for evaluation and treatment of high blood pressure.  [facility]itis.  He was told he had high blood pressure and should be checked.  He was taking lisinopril for this, but blood pressure was well controlled and he stopped.  He denies any chest pain, SOB, palpitations or dizziness.        ROS:      Review of Systems   Constitutional: Negative.  Negative for chills, diaphoresis and fever.   HENT: Negative.  Negative for congestion, ear pain, rhinorrhea and sore throat.    Eyes: Negative.  Negative for discharge, redness and itching.   Respiratory: Negative.  Negative for cough, chest tightness, shortness of breath and wheezing.    Cardiovascular: Negative.  Negative for chest pain and palpitations.   Gastrointestinal: Negative.  Negative for abdominal pain, diarrhea, nausea and vomiting.   Endocrine: Negative.  Negative for polydipsia and polyuria.   Genitourinary: Negative for dysuria, frequency, hematuria and urgency.   Musculoskeletal: Negative.  Negative for myalgias.   Skin: Negative for rash.   Allergic/Immunologic: Negative for immunocompromised state.   Neurological: Negative for dizziness, weakness, light-headedness and headaches.   Hematological: Negative for adenopathy.        Family History   Family History   Problem Relation Age of Onset     Hypertension Mother      Hypertension Father      Cancer Maternal Grandmother      Cerebrovascular Disease Maternal Grandfather      Unknown/Adopted Paternal Grandmother      Unknown/Adopted Paternal Grandfather      Diabetes No family hx of      Hyperlipidemia No family hx of      Colon Cancer No family hx of      Prostate Cancer No family hx of      Anxiety Disorder No family hx of      Depression No family hx of       Asthma No family hx of      Thyroid Disease No family hx of        Social History  Social History     Socioeconomic History     Marital status: Single     Spouse name: Not on file     Number of children: Not on file     Years of education: Not on file     Highest education level: Not on file   Occupational History     Not on file   Social Needs     Financial resource strain: Not on file     Food insecurity:     Worry: Not on file     Inability: Not on file     Transportation needs:     Medical: Not on file     Non-medical: Not on file   Tobacco Use     Smoking status: Never Smoker     Smokeless tobacco: Never Used   Substance and Sexual Activity     Alcohol use: Yes     Comment: rarely     Drug use: No     Sexual activity: Never   Lifestyle     Physical activity:     Days per week: Not on file     Minutes per session: Not on file     Stress: Not on file   Relationships     Social connections:     Talks on phone: Not on file     Gets together: Not on file     Attends Hoahaoism service: Not on file     Active member of club or organization: Not on file     Attends meetings of clubs or organizations: Not on file     Relationship status: Not on file     Intimate partner violence:     Fear of current or ex partner: Not on file     Emotionally abused: Not on file     Physically abused: Not on file     Forced sexual activity: Not on file   Other Topics Concern     Parent/sibling w/ CABG, MI or angioplasty before 65F 55M? Not Asked   Social History Narrative     Not on file        Surgical History:  Past Surgical History:   Procedure Laterality Date     NO HISTORY OF SURGERY          Problem List:  Patient Active Problem List   Diagnosis     CARDIOVASCULAR SCREENING; LDL GOAL LESS THAN 130     Acromioclavicular joint separation     Morbid obesity (H)     Cervicalgia     Asymptomatic hypertensive urgency     Acanthosis nigricans, acquired     Essential hypertension        Allergies:  Allergies   Allergen Reactions     Shrimp  Anaphylaxis and Swelling     Shellfish Allergy      Other reaction(s): Angioedema     Shellfish-Derived Products      Other reaction(s): Angioedema        Current Meds:    Current Outpatient Medications:      ibuprofen (ADVIL/MOTRIN) 600 MG tablet, Take 1 tablet (600 mg) by mouth every 6 hours as needed for moderate pain (Patient not taking: Reported on 11/21/2019), Disp: 30 tablet, Rfl: 1     lisinopril-hydrochlorothiazide (PRINZIDE/ZESTORETIC) 20-12.5 MG tablet, Take 1 tablet by mouth daily (Patient not taking: Reported on 11/21/2019), Disp: 180 tablet, Rfl: 3     order for DME, Equipment being ordered: Digital home blood pressure monitor kit (Patient not taking: Reported on 11/21/2019), Disp: 1 Device, Rfl: 0    Current Facility-Administered Medications:      triamcinolone acetonide (KENALOG-40) injection 40 mg, 40 mg, , , Repa, Sean Evanser, DO, 40 mg at 09/28/18 1645     PHYSICAL EXAM:     Vital signs noted and reviewed by Bebeto Petty PA-C  BP (!) 162/127 (BP Location: Left arm, Patient Position: Chair, Cuff Size: Adult Regular)   Pulse 104   Temp 98.6  F (37  C) (Oral)   Wt 123.7 kg (272 lb 12.8 oz)   SpO2 97%   BMI 46.83 kg/m       PEFR:    Physical Exam  Vitals signs and nursing note reviewed.   Constitutional:       General: He is not in acute distress.     Appearance: He is well-developed. He is not ill-appearing, toxic-appearing or diaphoretic.   HENT:      Head: Normocephalic and atraumatic.      Right Ear: Hearing, tympanic membrane, ear canal and external ear normal. Tympanic membrane is not perforated, erythematous, retracted or bulging.      Left Ear: Hearing, tympanic membrane, ear canal and external ear normal. Tympanic membrane is not perforated, erythematous, retracted or bulging.      Nose: Nose normal. No mucosal edema or rhinorrhea.      Mouth/Throat:      Pharynx: No oropharyngeal exudate or posterior oropharyngeal erythema.      Tonsils: No tonsillar exudate or tonsillar  abscesses. Swellin on the right. 0 on the left.   Eyes:      Pupils: Pupils are equal, round, and reactive to light.   Neck:      Musculoskeletal: Normal range of motion and neck supple.   Cardiovascular:      Rate and Rhythm: Normal rate and regular rhythm.      Heart sounds: Normal heart sounds, S1 normal and S2 normal. Heart sounds not distant. No murmur. No friction rub. No gallop.    Pulmonary:      Effort: Pulmonary effort is normal. No respiratory distress.      Breath sounds: Normal breath sounds. No decreased breath sounds, wheezing, rhonchi or rales.   Abdominal:      General: Bowel sounds are normal. There is no distension.      Palpations: Abdomen is soft.      Tenderness: There is no abdominal tenderness.   Lymphadenopathy:      Cervical: No cervical adenopathy.   Skin:     General: Skin is warm and dry.      Findings: No rash.   Neurological:      Mental Status: He is alert.      Cranial Nerves: No cranial nerve deficit.   Psychiatric:         Attention and Perception: He is attentive.         Speech: Speech normal.         Behavior: Behavior normal. Behavior is cooperative.         Thought Content: Thought content normal.         Judgment: Judgment normal.          Labs:     No results found for any visits on 19.    Medical Decision Making:    Differential Diagnosis:  HTN     ASSESSMENT:     1. Essential hypertension           PLAN:     Patient is asymptomatic in clinic today.  BP when last taken was 158/98.  Patient instructed to follow up with PCP in the next week for re-evaluation.  Staff assisted patient with making an appointment to follow up tomorrow or Monday.  Worrisome symptoms discussed with instructions to go to the ED.  Patient verbalized understanding and agreed with this plan.     Bebeto Petty PA-C  2019, 5:57 PM

## 2020-03-02 ENCOUNTER — HEALTH MAINTENANCE LETTER (OUTPATIENT)
Age: 25
End: 2020-03-02

## 2020-12-14 ENCOUNTER — HEALTH MAINTENANCE LETTER (OUTPATIENT)
Age: 25
End: 2020-12-14

## 2020-12-29 ENCOUNTER — NURSE TRIAGE (OUTPATIENT)
Dept: NURSING | Facility: CLINIC | Age: 25
End: 2020-12-29

## 2020-12-29 NOTE — TELEPHONE ENCOUNTER
COVID 19 Nurse Triage Plan/Patient Instructions    Please be aware that novel coronavirus (COVID-19) may be circulating in the community. If you develop symptoms such as fever, cough, or SOB or if you have concerns about the presence of another infection including coronavirus (COVID-19), please contact your health care provider or visit [email or web address].     Disposition/Instructions    Virtual Visit with provider recommended. Reference Visit Selection Guide.    Thank you for taking steps to prevent the spread of this virus.  o Limit your contact with others.  o Wear a simple mask to cover your cough.  o Wash your hands well and often.    Resources    [facility]: About COVID-19: [email or web address]    CDC: What to Do If You're Sick: www.cdc.gov/coronavirus/2019-ncov/about/steps-when-sick.html    CDC: Ending Home Isolation: www.cdc.gov/coronavirus/2019-ncov/hcp/disposition-in-home-patients.html     CDC: Caring for Someone: www.cdc.gov/coronavirus/2019-ncov/if-you-are-sick/care-for-someone.html     [facility]: Interim Guidance for Hospital Discharge to Home: www.health.[facility].mn.us/diseases/coronavirus/hcp/hospdischarge.pdf    [facility] clinical trials (COVID-19 research studies): clinicalaffairs.[facility]-clinical-trials     Below are the COVID-19 hotlines at the [place] Department of Health ([facility]). Interpreters are available.   o For health questions: Call 947-198-1892 or 1-792.930.1474 (7 a.m. to 7 p.m.)  o For questions about schools and childcare: Call 795-557-6118 or 1-339.733.4993 (7 a.m. to 7 p.m.)

## 2020-12-29 NOTE — TELEPHONE ENCOUNTER
"  Additional Information    Negative: Breathing stopped and hasn't returned    Negative: Choking on something    Negative: SEVERE difficulty breathing (e.g., struggling for each breath, speaks in single words, pulse > 120)    Negative: Bluish (or gray) lips or face    Negative: Difficult to awaken or acting confused (e.g., disoriented, slurred speech)    Negative: Passed out (i.e., fainted, collapsed and was not responding)    Negative: Wheezing started suddenly after medicine, an allergic food, or bee sting    Negative: Stridor    Negative: Slow, shallow and weak breathing    Negative: Sounds like a life-threatening emergency to the triager    Negative: Chest pain    Negative: Wheezing (high pitched whistling sound) and previous asthma attacks or use of asthma medicines    Negative: Difficulty breathing and only present when coughing    Negative: Difficulty breathing and only from stuffy or runny nose    Negative: MODERATE difficulty breathing (e.g., speaks in phrases, SOB even at rest, pulse 100-120) of new onset or worse than normal    Negative: Wheezing can be heard across the room    Negative: Drooling or spitting out saliva (because can't swallow)    Negative: Any history of prior \"blood clot\" in leg or lungs    Negative: Recent illness requiring prolonged bedrest (i.e., immobilization)    Negative: Hip or leg fracture in past 2 months (e.g., or had cast on leg or ankle)    Negative: Major surgery in the past month    Negative: Recent long-distance travel with prolonged time in car, bus, plane, or train (i.e., within past 2 weeks; 6 or  more hours duration)    Negative: Extra heart beats OR irregular heart beating   (i.e., \"palpitations\")    Negative: Fever > 103 F (39.4 C)    Negative: Fever > 101 F (38.3 C) and over 60 years of age    Negative: Fever > 100.0 F (37.8 C) and bedridden (e.g., nursing home patient, stroke, chronic illness, recovering from surgery)    Negative: Fever > 100.0 F (37.8 C) and " "diabetes mellitus or weak immune system (e.g., HIV positive, cancer chemo, splenectomy, organ transplant, chronic steroids)    Negative: Periods where breathing stops and then resumes normally and bedridden (e.g., nursing home patient, CVA)    Negative: Pregnant or postpartum (from 0 to 6 weeks after delivery)    Negative: Patient sounds very sick or weak to the triager    Negative: MILD difficulty breathing (e.g., minimal/no SOB at rest, SOB with walking, pulse < 100) of new onset or worse than normal    Negative: Longstanding difficulty breathing (e.g., CHF, COPD, emphysema) and worse than normal    Negative: Longstanding difficulty breathing and not responding to usual therapy    Negative: Continuous (nonstop) coughing    Negative: Patient wants to be seen    MILD longstanding difficulty breathing (e.g., speaks in phrases, SOB even at rest, pulse 100-120) and SAME as normal    Negative: MODERATE longstanding difficulty breathing (e.g., speaks in phrases, SOB even at rest, pulse 100-120) and SAME as normal    Answer Assessment - Initial Assessment Questions  1. RESPIRATORY STATUS: \"Describe your breathing?\" (e.g., wheezing, shortness of breath, unable to speak, severe coughing)       Short of breath during the night  2. ONSET: \"When did this breathing problem begin?\"       Last night  3. PATTERN \"Does the difficult breathing come and go, or has it been constant since it started?\"       Gone now  4. SEVERITY: \"How bad is your breathing?\" (e.g., mild, moderate, severe)     - MILD: No SOB at rest, mild SOB with walking, speaks normally in sentences, can lay down, no retractions, pulse < 100.     - MODERATE: SOB at rest, SOB with minimal exertion and prefers to sit, cannot lie down flat, speaks in phrases, mild retractions, audible wheezing, pulse 100-120.     - SEVERE: Very SOB at rest, speaks in single words, struggling to breathe, sitting hunched forward, retractions, pulse > 120       Was hard to go to sleep  5. " "RECURRENT SYMPTOM: \"Have you had difficulty breathing before?\" If so, ask: \"When was the last time?\" and \"What happened that time?\"       no  6. CARDIAC HISTORY: \"Do you have any history of heart disease?\" (e.g., heart attack, angina, bypass surgery, angioplasty)       HTN   7. LUNG HISTORY: \"Do you have any history of lung disease?\"  (e.g., pulmonary embolus, asthma, emphysema)      no  8. CAUSE: \"What do you think is causing the breathing problem?\"       ?  9. OTHER SYMPTOMS: \"Do you have any other symptoms? (e.g., dizziness, runny nose, cough, chest pain, fever)      Not really  10. PREGNANCY: \"Is there any chance you are pregnant?\" \"When was your last menstrual period?\"        no  11. TRAVEL: \"Have you traveled out of the country in the last month?\" (e.g., travel history, exposures)        no    Protocols used: BREATHING DIFFICULTY-A-OH    "

## 2020-12-31 ENCOUNTER — NURSE TRIAGE (OUTPATIENT)
Dept: NURSING | Facility: CLINIC | Age: 25
End: 2020-12-31

## 2020-12-31 NOTE — TELEPHONE ENCOUNTER
"Pt calling in saying he tested positive for covid on Tuesday   Doing fairly well   Last temp 99.5  Does c/o muscle aches     Speaking in full sentences   Pt says when he lies down - at times \" it seems like im not swallowing right \"  No breathing issues at present    Pt is staying hydrated   Is urinating \" ok\"    Advised to monitor for now   continue to push fluids   Tylenol for body aches    Go to [facility] web site for a covid resource    UC as a back up plan     Protocol and care advice reviewed  Caller states understanding of the recommended disposition  Advised to call back if further questions or concerns    Rivera Matute RN / [facility]    Additional Information    Negative: [1] Severe difficulty swallowing (e.g., drooling or spitting) AND [2] started suddenly after taking a medicine or allergic food    Negative: Wheezing, stridor, hoarseness, or difficulty breathing    Negative: [1] Swollen tongue AND [2] sudden onset    Negative: Sounds like a life-threatening emergency to the triager    Negative: SEVERE difficulty swallowing (e.g., drooling or spitting, can't swallow water)    Negative: [1] Symptoms of blocked esophagus (e.g., can't swallow normal secretions, drooling) AND [2] present now    Negative: Symptoms of food or bone stuck in throat or esophagus (e.g., pain in throat or chest, FB sensation, blood-tinged saliva)    Negative: SEVERE symptoms of pill stuck in throat or esophagus (e.g., severe pain, bleeding, or inability to swallow liquids)    Negative: [1] Drinking very little AND [2] dehydration suspected (e.g., no urine > 12 hours, very dry mouth, very lightheaded)    Negative: [1] Refuses to drink anything AND [2] for > 12 hours    Negative: Patient sounds very sick or weak to the triager    Negative: [1] Coughing spells AND [2] occur during eating/feedings or within 2 hours    Negative: Fever > 100.5 F (38.1 C)    Negative: Weak immune system (e.g., HIV positive, cancer chemo, " splenectomy, organ transplant, chronic steroids)    [1] Swallowing difficulty AND [2] cause unknown (Exception: difficulty swallowing is a chronic symptom)    Negative: [1] Symptoms of pill stuck in throat or esophagus (e.g., pain in throat or chest, FB sensation) AND [2] no relief after using CARE ADVICE    Protocols used: SWALLOWING DIFFICULTY-A-AH

## 2021-01-05 ENCOUNTER — VIRTUAL VISIT (OUTPATIENT)
Dept: FAMILY MEDICINE | Facility: OTHER | Age: 26
End: 2021-01-05
Payer: COMMERCIAL

## 2021-01-05 ENCOUNTER — NURSE TRIAGE (OUTPATIENT)
Dept: NURSING | Facility: CLINIC | Age: 26
End: 2021-01-05

## 2021-01-05 PROCEDURE — 99421 OL DIG E/M SVC 5-10 MIN: CPT | Performed by: NURSE PRACTITIONER

## 2021-01-05 NOTE — PROGRESS NOTES
"Date: 2021 04:48:06  Clinician: Roseanna Mckeon  Clinician NPI: [id number]  Patient: Lauri Ma  Patient : 1995  Patient Address: [address] [place] [address] [address] [place] [address]  Patient Phone: [phone]  Visit Protocol: URI  Patient Summary:  Lauri is a 25 year old ( : 1995 ) male who initiated a [facility] Visit for COVID-19 (Coronavirus) evaluation and screening. When asked the question \"Please sign me up to receive news, health information and promotions from [facility].\", Lauri responded \"No\".    Lauri states his symptoms started suddenly 5-6 days ago. After his symptoms started, they improved and then got worse again.   His symptoms consist of diarrhea, myalgia, anosmia, a headache, wheezing, a cough, nasal congestion, nausea, chills, malaise, a sore throat, and ageusia. He is experiencing difficulty breathing due to nasal congestion but he is not short of breath. Lauri also feels feverish.   Symptom details     Nasal secretions: The color of his mucus is yellow, clear, and blood-tinged.    Cough: Lauri coughs every 5-10 minutes and his cough is more bothersome at night. Phlegm comes into his throat when he coughs. He does not believe his cough is caused by post-nasal drip. The color of the phlegm is clear, blood-tinged, and yellow.     Sore throat: Lauri reports having mild throat pain (1-3 on a 10 point pain scale), does not have exudate on his tonsils, and can swallow liquids. He is not sure if the lymph nodes in his neck are enlarged. A rash has not appeared on the skin since the sore throat started.     Temperature: His current temperature is 101.0 degrees Fahrenheit. Lauri has had a temperature over 100 degrees Fahrenheit for 5-7 days.     Wheezing: Additional wheezing details as reported by the patient (free text): its uncomfortable, it happens when i breathe into my nose and out of my mouth       Headache: He states the headache is moderate (4-6 on a 10 point pain scale).      Lauri denies " having facial pain or pressure, ear pain, teeth pain, vomiting, and rhinitis. He also denies having a sinus infection within the past year, having recent facial or sinus surgery in the past 60 days, and taking antibiotic medication in the past month.   Precipitating events  Within the past week, Lauri has not been exposed to someone with strep throat. He has not recently been exposed to someone with influenza. Lauri has been in close contact with the following high risk individuals: children under the age of 5.   Pertinent COVID-19 (Coronavirus) information  Lauri does not work or volunteer as healthcare worker or a . In the past 14 days, Lauri has not worked or volunteered at a healthcare facility or group living setting.   In the past 14 days, he also has not lived in a congregate living setting.   Lauri has not had a close contact with a laboratory-confirmed COVID-19 patient within 14 days of symptom onset.    Lauri has been tested for COVID-19.      Date(s) of his COVID-19 test as reported by the patient (free text): 12/29/2020       Result of COVID-19 test as reported by the patient (free text): positive       Type of test as reported by the patient (free text): saliva        Pertinent medical history  He has not been told by his provider to avoid NSAIDs.   Lauri does not have diabetes. He denies having immunosuppressive conditions (e.g., chemotherapy, HIV, organ transplant, long-term use of steroids or other immunosuppressive medications, splenectomy). He denies having congestive heart failure and severe COPD. He does not have asthma.   Lauri does not need a return to work/school note.   Lauri does not smoke or use smokeless tobacco.   Weight: 285 lbs    MEDICATIONS: ibuprofen-oxycodone oral, Mucinex oral, ALLERGIES: NKDA  Clinician Response:  Dear Lauri,   Your symptoms show that you may have coronavirus (COVID-19). This illness can cause fever, cough and trouble breathing. Many people get a  "mild case and get better on their own. Some people can get very sick.  What should I do?  We would like to test you for this virus.   1. Please call [phone] to schedule your visit. Explain that you were referred by [facility] to have a COVID-19 test. Be ready to [facility].  * If you need to schedule in [facility] please call [phone] or for [employer] employees please call [phone].  * If you need to schedule in the [place] area please call [phone]. [place] employees call [phone].  The following will serve as your written order for this COVID Test, ordered by me, for the indication of suspected COVID [Z20.828]: The test will be ordered in [facility], our electronic health record, after you are scheduled. It will show as ordered and authorized by Pancho Gee MD.  Order: COVID-19 (Coronavirus) PCR for SYMPTOMATIC testing from [facility].   2. When it's time for your COVID test:  Stay at least 6 feet away from others. (If someone will drive you to your test, stay in the backseat, as far away from the  as you can.)   Cover your mouth and nose with a mask, tissue or washcloth.  Go straight to the testing site. Don't make any stops on the way there or back.      3.Starting now: Stay home and away from others (self-isolate) until:   You've had no fever---and no medicine that reduces fever---for one full day (24 hours). And...   Your other symptoms have gotten better. For example, your cough or breathing has improved. And...   At least 10 days have passed since your symptoms started.       During this time, don't leave the house except for testing or medical care.   Stay in your own room, even for meals. Use your own bathroom if you can.   Stay away from others in your home. No hugging, kissing or shaking hands. No visitors.  Don't go to work, school or anywhere else.    Clean \"high touch\" surfaces often (doorknobs, counters, handles, etc.). Use a household cleaning spray or wipes. " You'll find a full list of  on the EPA website: www.epa.gov/pesticide-registration/list-n-disinfectants-use-against-sars-cov-2.   Cover your mouth and nose with a mask, tissue or washcloth to avoid spreading germs.  Wash your hands and face often. Use soap and water.  Caregivers in these groups are at risk for severe illness due to COVID-19:  o People 65 years and older  o People who live in a nursing home or long-term care facility  o People with chronic disease (lung, heart, cancer, diabetes, kidney, liver, immunologic)  o People who have a weakened immune system, including those who:   Are in cancer treatment  Take medicine that weakens the immune system, such as corticosteroids  Had a bone marrow or organ transplant  Have an immune deficiency  Have poorly controlled HIV or AIDS  Are obese (body mass index of 40 or higher)  Smoke regularly   o Caregivers should wear gloves while washing dishes, handling laundry and cleaning bedrooms and bathrooms.  o Use caution when washing and drying laundry: Don't shake dirty laundry, and use the warmest water setting that you can.  o For more tips, go to www.cdc.gov/coronavirus/2019-ncov/downloads/10Things.pdf.    4.Sign up for [employer]. We know it's scary to hear that you might have COVID-19. We want to track your symptoms to make sure you're okay over the next 2 weeks. Please look for an email from [employer]---this is a free, online program that we'll use to keep in touch. To sign up, follow the link in the email. Learn more at [email or web address]  How can I take care of myself?   Get lots of rest. Drink extra fluids (unless a doctor has told you not to).   Take Tylenol (acetaminophen) for fever or pain. If you have liver or kidney problems, ask your family doctor if it's okay to take Tylenol.   Adults can take either:    650 mg (two 325 mg pills) every 4 to 6 hours, or...   1,000 mg (two 500 mg pills) every 8 hours as needed.    Note: Don't  take more than 3,000 mg in one day. Acetaminophen is found in many medicines (both prescribed and over-the-counter medicines). Read all labels to be sure you don't take too much.   For children, check the Tylenol bottle for the right dose. The dose is based on the child's age or weight.    If you have other health problems (like cancer, heart failure, an organ transplant or severe kidney disease): Call your specialty clinic if you don't feel better in the next 2 days.       Know when to call 911. Emergency warning signs include:    Trouble breathing or shortness of breath Pain or pressure in the chest that doesn't go away Feeling confused like you haven't felt before, or not being able to wake up Bluish-colored lips or face.  Where can I get more information?   [facility] -- About COVID-19: [email or web address]   CDC -- What to Do If You're Sick: www.cdc.gov/coronavirus/2019-ncov/about/steps-when-sick.html   CDC -- Ending Home Isolation: www.cdc.gov/coronavirus/2019-ncov/hcp/disposition-in-home-patients.html   CDC -- Caring for Someone: www.cdc.gov/coronavirus/2019-ncov/if-you-are-sick/care-for-someone.html   [facility] -- Interim Guidance for Hospital Discharge to Home: www.health.[facility].[place].us/diseases/coronavirus/hcp/hospdischarge.pdf   [facility] clinical trials (COVID-19 research studies): clinicalaffairs.[facility]-clinical-trials    Below are the COVID-19 hotlines at the [facility] ([facility]). Interpreters are available.    For health questions: Call 893-848-1023 or 1-594.362.4716 (7 a.m. to 7 p.m.) For questions about schools and childcare: Call 089-960-5501 or 1-502.789.7652 (7 a.m. to 7 p.m.)    Diagnosis: Other malaise  Diagnosis ICD: R53.81

## 2021-01-05 NOTE — TELEPHONE ENCOUNTER
Day 7 of Covid quarantine   Tested positive Tuesday     Yesterday was coughing up blood  -not much blood in mucus  -streaks in the mucus   -raven brown/red    Mild difficulty breathing, worse when laying down   -this has not changed or worsened since he was last seen    Fever 100.1  -Not worsened     No chest pain    Advised virtual visit with [email or web address]. Patient is agreeable.    COVID 19 Nurse Triage Plan/Patient Instructions    Please be aware that novel coronavirus (COVID-19) may be circulating in the community. If you develop symptoms such as fever, cough, or SOB or if you have concerns about the presence of another infection including coronavirus (COVID-19), please contact your health care provider or visit [email or web address].     Disposition/Instructions    Virtual Visit with provider recommended. Reference Visit Selection Guide.    Thank you for taking steps to prevent the spread of this virus.  o Limit your contact with others.  o Wear a simple mask to cover your cough.  o Wash your hands well and often.    Resources    [facility]: About COVID-19: [email or web address]    CDC: What to Do If You're Sick: www.cdc.gov/coronavirus/2019-ncov/about/steps-when-sick.html    CDC: Ending Home Isolation: www.cdc.gov/coronavirus/2019-ncov/hcp/disposition-in-home-patients.html     CDC: Caring for Someone: www.cdc.gov/coronavirus/2019-ncov/if-you-are-sick/care-for-someone.html     [facility]: Interim Guidance for Hospital Discharge to Home: www.health.[facility].mn.us/diseases/coronavirus/hcp/hospdischarge.pdf    [facility] clinical trials (COVID-19 research studies): clinicalaffairs.[facility]-clinical-trials     Below are the COVID-19 hotlines at the [facility] ([facility]). Interpreters are available.   o For health questions: Call 955-063-0313 or 1-561.472.6655 (7 a.m. to 7 p.m.)  o For questions about schools and childcare: Call 162-698-1303 or 1-950.538.4020 (7 a.m. to 7 p.m.)     Reason  for Disposition    MILD difficulty breathing (e.g., minimal/no SOB at rest, SOB with walking, pulse <100)    Additional Information    Negative: SEVERE difficulty breathing (e.g., struggling for each breath, speaks in single words)    Negative: Difficult to awaken or acting confused (e.g., disoriented, slurred speech)    Negative: Bluish (or gray) lips or face now    Negative: Shock suspected (e.g., cold/pale/clammy skin, too weak to stand, low BP, rapid pulse)    Negative: Sounds like a life-threatening emergency to the triager    Negative: [1] COVID-19 exposure AND [2] no symptoms    Negative: [1] Lives with someone known to have influenza (flu test positive) AND [2] flu-like symptoms (e.g., cough, runny nose, sore throat, SOB; with or without fever)    Negative: [1] Adult with possible COVID-19 symptoms AND [2] triager concerned about severity of symptoms or other causes    Negative: Immunization reaction suspected (e.g., fever, headache, muscle aches occurring during days 1-3 days after immunization)    Negative: COVID-19 and breastfeeding, questions about    Negative: SEVERE or constant chest pain or pressure (Exception: mild central chest pain, present only when coughing)    Negative: MODERATE difficulty breathing (e.g., speaks in phrases, SOB even at rest, pulse 100-120)    Negative: [1] Headache AND [2] stiff neck (can't touch chin to chest)    Protocols used: CORONAVIRUS (COVID-19) DIAGNOSED OR [id number] 12.1    Yareli Ceron RN on 1/5/2021 at 4:22 AM

## 2021-01-06 ENCOUNTER — NURSE TRIAGE (OUTPATIENT)
Dept: FAMILY MEDICINE | Facility: CLINIC | Age: 26
End: 2021-01-06

## 2021-01-06 DIAGNOSIS — U07.1 INFECTION DUE TO 2019 NOVEL CORONAVIRUS: Primary | ICD-10-CM

## 2021-01-06 NOTE — TELEPHONE ENCOUNTER
Spoke with wife.  Patient was in bathroom.  He is on Covid day 8, still coughing and chest pains with deep breathing and coughing.      Chest pains: hard to breath when he coughs (wife was unable to give scale).  Lungs hurt with deep breath.  Still has fevers that come and go. , body aches, headaches.  Did virtual yesterday and they wanted him to be tested. Wife is calling today because they are all positive for covid already but he is struggling about the same today. He is not on any inhaler or steroid.  Just taking mucinex every 4 hours and increasing fluids.     Advised ED if SOB is keeping from walking across a room or if he feels like he is struggling for breath.      Will send to primary care provider for further advice for second level triage of in clinic visit VS ED    SAIDA BlancoN, RN, PHN    (Please route response(s) to your care team for any follow up that is needed. Thank you!)                Reason for Disposition    [1] Fever returns after gone for over 24 hours AND [2] symptoms worse or not improved    Chest pain or pressure    Additional Information    Negative: SEVERE difficulty breathing (e.g., struggling for each breath, speaks in single words)    Negative: Difficult to awaken or acting confused (e.g., disoriented, slurred speech)    Negative: Bluish (or gray) lips or face now    Negative: Shock suspected (e.g., cold/pale/clammy skin, too weak to stand, low BP, rapid pulse)    Negative: Sounds like a life-threatening emergency to the triager    Negative: [1] COVID-19 exposure AND [2] no symptoms    Negative: [1] Lives with someone known to have influenza (flu test positive) AND [2] flu-like symptoms (e.g., cough, runny nose, sore throat, SOB; with or without fever)    Negative: [1] Adult with possible COVID-19 symptoms AND [2] triager concerned about severity of symptoms or other causes    Negative: Immunization reaction suspected (e.g., fever, headache, muscle aches occurring during  days 1-3 days after immunization)    Negative: COVID-19 and breastfeeding, questions about    Negative: SEVERE or constant chest pain or pressure (Exception: mild central chest pain, present only when coughing)    Negative: MODERATE difficulty breathing (e.g., speaks in phrases, SOB even at rest, pulse 100-120)    Negative: [1] Headache AND [2] stiff neck (can't touch chin to chest)    Negative: [1] HIGH RISK patient (e.g., age > 64 years, diabetes, heart or lung disease, weak immune system) AND [2] new or worsening symptoms    Negative: [1] HIGH RISK patient AND [2] influenza is widespread in the community AND [3] ONE OR MORE respiratory symptoms: cough, sore throat, runny or stuffy nose    Negative: [1] HIGH RISK patient AND [2] influenza exposure within the last 7 days AND [3] ONE OR MORE respiratory symptoms: cough, sore throat, runny or stuffy nose    Negative: Fever > 103 F (39.4 C)    Negative: [1] Fever > 101 F (38.3 C) AND [2] age > 60    Negative: [1] Fever > 100.0 F (37.8 C) AND [2] bedridden (e.g., nursing home patient, CVA, chronic illness, recovering from surgery)    Protocols used: CORONAVIRUS (COVID-19) DIAGNOSED OR [id number]-I-LR 12.1

## 2021-01-08 RX ORDER — BENZONATATE 200 MG/1
200 CAPSULE ORAL 3 TIMES DAILY PRN
Qty: 21 CAPSULE | Refills: 3 | Status: SHIPPED | OUTPATIENT
Start: 2021-01-08 | End: 2021-05-27

## 2021-01-08 NOTE — TELEPHONE ENCOUNTER
Wife calling to see if Lauri is in need of an antibiotic after getting pneumonia due to COVID. Today is his last day of quarantine and he is still coughing quite a bit. He is feeling better and has no fever now, but wife is concerned that due to the pneumonia and it being an infections that he may need an antibiotic. She is also not sure if it is contraindicated to give an antibiotic with a recent COVID infections since they did not give him any antibiotics at the hospital. Please advise.       Azalea Stark RN, BSN, PHN

## 2021-01-08 NOTE — TELEPHONE ENCOUNTER
If fever has resolved and doing better, no need for antibiotic. Continue with symptomatic cares. Tessalon pearles sent to pharmacy to try to help with cough. If breathing worsening, patient should be seen to check vitals, cxr and an exam.    Braxton Calabrese MD

## 2021-01-08 NOTE — TELEPHONE ENCOUNTER
This writer attempted to contact Lauri (No consent to communicate on file to speak to wife) on 01/08/21      Reason for call provider's message below and left message.      If patient calls back:   Transfer to Nurse at [facility]     Natalia Ortega RN  [facility]

## 2021-01-11 NOTE — TELEPHONE ENCOUNTER
(2nd attempt) This writer attempted to contact Lauri on 01/11/21      Reason for call triage needs/provider's message below and left message.    If patient calls back:   Transfer to Nurse at [facility]     Natalia Ortega RN  [facility]

## 2021-01-12 ENCOUNTER — VIRTUAL VISIT (OUTPATIENT)
Dept: FAMILY MEDICINE | Facility: CLINIC | Age: 26
End: 2021-01-12
Payer: COMMERCIAL

## 2021-01-12 DIAGNOSIS — R06.09 DOE (DYSPNEA ON EXERTION): ICD-10-CM

## 2021-01-12 DIAGNOSIS — U07.1 2019 NOVEL CORONAVIRUS DISEASE (COVID-19): Primary | ICD-10-CM

## 2021-01-12 PROCEDURE — 99213 OFFICE O/P EST LOW 20 MIN: CPT | Mod: TEL | Performed by: PHYSICIAN ASSISTANT

## 2021-01-12 NOTE — PROGRESS NOTES
Lauri is a 25 year old who is being evaluated via a billable telephone visit.      What phone number would you like to be contacted at? [phone]  How would you like to obtain your AVS? MyChart       Assessment & Plan sounds well and continuing to improve. See letter for a few more days off to continue to recover given the vigorous and outdoor work he does.  If symptoms continue will refer to adult covid clinic.    Problem List Items Addressed This Visit     None      Visit Diagnoses     2019 novel coronavirus disease (COVID-19)    -  Primary    MONTEJO (dyspnea on exertion)               Review of prior external note(s) from - CareEverywhere information from ED visit reviewed              Return in about 1 week (around 1/19/2021), or if symptoms worsen or fail to improve.    CESAR Raza  [facility]    Richard Carreon is a 25 year old who presents to clinic today for the following health issues      HPI       Breathing problem patient was discharged from ED for COVID on Jan 6, 2021, Need a note work to be off this week due to mild to moderate MONTEJO, though otherwise is generally improving.  Symptoms started about 5 days prior to that. Xr showed infiltrates c/w Covid.  SUuposed to return to work tomorrow- would like to instead return the 18th.  Runs cable, climbs ladder,s often outsdie.    No fevers recently.      Review of Systems   RESP MONTEJO as above      Objective           Vitals:  No vitals were obtained today due to virtual visit.    Physical Exam   healthy, alert and no distress  PSYCH: Alert and oriented times 3; coherent speech, normal   rate and volume, able to articulate logical thoughts, able   to abstract reason, no tangential thoughts, no hallucinations   or delusions  His affect is normal  RESP: No cough, no audible wheezing, able to talk in full sentences  Remainder of exam unable to be completed due to telephone visits              Phone call duration:7  minutes

## 2021-01-12 NOTE — TELEPHONE ENCOUNTER
Patient contacted via wife's cell phone. Wife gave patient the phone. Patient is feeling better. We had a hard time reaching him by phone, but did confirm the phone number [phone] is patient's phone number.     The only difficulty breathing he notes is feeling winded going up and down stairs. He does not have any fevers. Patient is worried about returning to work. He wanted a doctor's note and to discuss workability/return to work.     Advised visit with provider. Patient preferred virtual, telephone visit. Assisted in scheduling telephone visit with Eugenio Quiroga for today at 11:20 am.     He did not  the tessalon michael Rx at pharmacy yet. He would like that Rx transferred to [facility] in [place]. ( I will route to Eugenio to review if this Rx is still indicated and re-send to patient's preferred pharmacy, if indicated).     Natalia Ortega RN  [facility]

## 2021-01-12 NOTE — LETTER
[address] [address]  Phone: [phone]    January 12, 2021        Lauri Ma  [address] [facility] [address]          To whom it may concern:    RE: Lauri Barnardanamaria    Patient evaluated today.  Patient excused from work from 1/13/2021 through and including 1/17/2021.    Patient may return to work 1/18/2021 without restrictions.          Please contact me for questions or concerns.      Sincerely,        CESAR Raza

## 2021-03-14 ENCOUNTER — RECORDS - HEALTHEAST (OUTPATIENT)
Dept: ADMINISTRATIVE | Facility: OTHER | Age: 26
End: 2021-03-14

## 2021-03-15 ENCOUNTER — OFFICE VISIT (OUTPATIENT)
Dept: ORTHOPEDICS | Facility: CLINIC | Age: 26
End: 2021-03-15
Payer: COMMERCIAL

## 2021-03-15 VITALS
SYSTOLIC BLOOD PRESSURE: 147 MMHG | DIASTOLIC BLOOD PRESSURE: 98 MMHG | WEIGHT: 270 LBS | BODY MASS INDEX: 40.92 KG/M2 | HEIGHT: 68 IN

## 2021-03-15 DIAGNOSIS — G56.03 CARPAL TUNNEL SYNDROME, BILATERAL: ICD-10-CM

## 2021-03-15 DIAGNOSIS — M25.50 ARTHRALGIA, UNSPECIFIED JOINT: Primary | ICD-10-CM

## 2021-03-15 PROCEDURE — 99204 OFFICE O/P NEW MOD 45 MIN: CPT | Performed by: PEDIATRICS

## 2021-03-15 ASSESSMENT — MIFFLIN-ST. JEOR: SCORE: 2174.21

## 2021-03-15 NOTE — PROGRESS NOTES
ASSESSMENT & PLAN    Diagnoses and all orders for this visit:    Arthralgia, unspecified joint  -     Anti Nuclear Teresa IgG by IFA with Reflex  -     CBC with platelets differential; Future  -     CRP inflammation; Future  -     Erythrocyte sedimentation rate auto; Future  -     Lyme Disease Teresa with reflex to WB Serum; Future  -     Rheumatoid factor; Future  -     Uric acid; Future  Wrist pain could represent exacerbation of carpal tunnel syndrome but given acute onset of diffuse joint pain reasonable to rule out other systemic causes for symptoms first.    Carpal tunnel syndrome, bilateral  Could consider surgical consult once work up for diffuse joint pain is complete as patient reports his symptoms have become increasingly bothersome and he would like to feel better.      This issue is acute and Worsening.      See Patient Instructions  Patient Instructions   Lab work ordered today  Clinic will contact you with lab results  Work note provided  Oral steroid may be indicated next, pending lab results.         If you have any further questions for your physician or physician s care team you can call [phone] and use option 3 to leave a voice message. Calls received during business hours will be returned same day.        Amber Scheierl, NISHANT Student    **  I saw and examined the patient with the student. I agree with the information in this note.  Acute issue, prognosis unclear.  Known CTS. However, multiple arthralgias.  Plan lab studies next. Evaluate for potential systemic inflammatory issue. If noted, then address accordingly, potential rheumatology eval. Otherwise, with known CTS and ongoing symptoms, potential return to see ortho surgeon.  Questions answered. Discussed signs and symptoms that may indicate more serious issues; the patient was instructed to seek appropriate care if noted. Lauri indicates understanding of these issues and agrees with the plan.        Sean Posey DO  [facility]  "[facility]    -----  No chief complaint on file.      SUBJECTIVE  Lauri Ma is a/an 25 year old male who is seen as a self referral for evaluation of multiple joint pain.  About 10 days ago he was seen by  for left ankle pain and was told he had gout. He has had stiff neck and stiff jaw with pain over the past week.  Feels he has a \"flare\" of pain in all of his joints.  He states he feels like he just got done with a full body workout and his entire body is sore.   Pain in all of his fingers and wrist.  Difficulty trying to make a full fist.  Does have HX of carpal tunnel syndrome.    Was given indomethacin by  and feels he only had 1-2 days of relief.        The patient is seen by themselves.  The patient is Right handed    Onset: 10 day(s) ago. Reports insidious onset without acute precipitating event.  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      No family history pertinent to patient's problem today.   **      Pain location varies. Describes as soreness throughout joints. Severity waxes and wanes but at its worst is a 10/10. Affecting his sleep. Ice helps some but otherwise not much changes the pain. Includes ankles, wrists, elbows, shoulders, and knees bilaterally. Also, endorses some neck and jaw pain. Hip and back are spared. Has not had numerous joints hurt like this in the past.    No open sores or wounds on skin  Reports no risk of STI exposure  No ill contacts    No known family hx of rheumatologic conditions  Father does have a history of gout    REVIEW OF SYSTEMS:  Review of Systems  CONSTITUTIONAL:NEGATIVE for fever, chills, change in weight  INTEGUMENTARY/SKIN: NEGATIVE for worrisome rashes or lesions  RESP:NEGATIVE for SOB/dyspnea  CV: NEGATIVE for " "chest pain/chest pressure  GI: NEGATIVE for constipation, diarrhea, nausea and weight loss  : NEGATIVE for dysuria, hesitancy and retention  MUSCULOSKELETAL: POSITIVE  for joint pain as described in HPI and neck pain and NEGATIVE for joint swelling, joint warmth and myalgias  NEURO: POSITIVE for numbness or tingling and burning of hands bilaterally, Hx of carpal tunnel syndrome bilaterally and NEGATIVE for dizziness/lightheadedness  HEME/ALLERGY/IMMUNE: NEGATIVE for chills, fever, night sweats and weight loss    OBJECTIVE:  BP (!) 147/98   Ht 1.727 m (5' 8\")   Wt 122.5 kg (270 lb)   BMI 41.05 kg/m     General: healthy, alert and in no distress  HEENT: no scleral icterus or conjunctival erythema  Skin: no suspicious lesions or rash. No jaundice.  CV: distal perfusion of upper extremities intact, radial pulses 2+  Resp: normal respiratory effort without conversational dyspnea   Psych: normal mood and affect  Gait: normal steady gait with appropriate coordination and balance  Neuro: Normal light sensory exam of bilateral upper extremities    Hand/Wrist Musculoskeletal Exam    Inspection       Inspection - Right        Erythema: none      Ecchymosis: none      Edema: none      Deformity: none      Inspection - Left         Erythema: none      Ecchymosis: none      Edema: none      Deformity: none    Palpation      Palpation - Right         Right wrist palpation is normal.      Palpation - Left         Left wrist palpation is normal.    Range of Motion      Range of Motion - Right Hand         Right hand range of motion is normal.      Range of Motion - Left Hand         Left hand range of motion is normal.      Range of Motion - Right Wrist         Right wrist range of motion is normal.      Range of Motion - Left Wrist         Left wrist range of motion is normal.      Range of motion additional comments: He notes tightness with ROM, especially composite digit flexion    Strength      Strength - Right Hand         " Right hand strength is normal.      Strength - Left Hand         Left hand strength is normal.      Strength - Right Wrist         Right wrist strength is normal.      Strength - Left Wrist         Left wrist strength is normal.    Neurovascular      Neurovascular - Right         Radial pulse: 2+      Capillary refill: <3 sec      Neurovascular - Left         Radial pulse: 2+      Capillary refill: <3 sec    Special Tests      Special Tests - Right        Phalen's: positive      Tinel's - carpal tunnel: negative      Special Tests - Left        Phalen's: positive      Tinel's - carpal tunnel: negative      RADIOLOGY:  None today.      Labs:      URIC ACID, SERUM (03/05/2021 10:16 AM CST)  URIC ACID, SERUM (03/05/2021 10:16 AM CST)   Component Value Ref Range Performed At Pathologist Signature   URIC ACID 8.5 (H) 3.5 - 7.2 mg/dL [facility]          Review of prior external note(s) from - ED  Review of the result(s) of each unique test - lab  Independent interpretation of a test performed by another physician/other qualified health care professional (not separately reported) - lab  30 minutes spent on the date of the encounter doing chart review, history and exam, documentation and further activities as noted above

## 2021-03-15 NOTE — LETTER
March 15, 2021      RE:Lauri Ma      To whom it may concern:      Lauri was seen today in clinic.  He is to remain off of work for 1 week.         Sincerely,        Sean Posey DO [facility]

## 2021-03-15 NOTE — LETTER
3/15/2021         RE: Lauri Ma  [address]  [place] [address]        Dear Colleague,    Thank you for referring your patient, Lauri Ma, to the [facility]. Please see a copy of my visit note below.    ASSESSMENT & PLAN    Diagnoses and all orders for this visit:    Arthralgia, unspecified joint  -     Anti Nuclear Teresa IgG by IFA with Reflex  -     CBC with platelets differential; Future  -     CRP inflammation; Future  -     Erythrocyte sedimentation rate auto; Future  -     Lyme Disease Teresa with reflex to WB Serum; Future  -     Rheumatoid factor; Future  -     Uric acid; Future  Wrist pain could represent exacerbation of carpal tunnel syndrome but given acute onset of diffuse joint pain reasonable to rule out other systemic causes for symptoms first.    Carpal tunnel syndrome, bilateral  Could consider surgical consult once work up for diffuse joint pain is complete as patient reports his symptoms have become increasingly bothersome and he would like to feel better.      This issue is acute and Worsening.      See Patient Instructions  Patient Instructions   Lab work ordered today  Clinic will contact you with lab results  Work note provided  Oral steroid may be indicated next, pending lab results.         If you have any further questions for your physician or physician s care team you can call [phone] and use option 3 to leave a voice message. Calls received during business hours will be returned same day.        Amber Scheierl, NISHANT Student    **  I saw and examined the patient with the student. I agree with the information in this note.  Acute issue, prognosis unclear.  Known CTS. However, multiple arthralgias.  Plan lab studies next. Evaluate for potential systemic inflammatory issue. If noted, then address accordingly, potential rheumatology eval. Otherwise, with known CTS and ongoing symptoms, potential return to see ortho surgeon.  Questions  "answered. Discussed signs and symptoms that may indicate more serious issues; the patient was instructed to seek appropriate care if noted. Lauri indicates understanding of these issues and agrees with the plan.        Sean Posey DO  [facility]    -----  No chief complaint on file.      SUBJECTIVE  Lauri Ma is a/an 25 year old male who is seen as a self referral for evaluation of multiple joint pain.  About 10 days ago he was seen by  for left ankle pain and was told he had gout. He has had stiff neck and stiff jaw with pain over the past week.  Feels he has a \"flare\" of pain in all of his joints.  He states he feels like he just got done with a full body workout and his entire body is sore.   Pain in all of his fingers and wrist.  Difficulty trying to make a full fist.  Does have HX of carpal tunnel syndrome.    Was given indomethacin by  and feels he only had 1-2 days of relief.        The patient is seen by themselves.  The patient is Right handed    Onset: 10 day(s) ago. Reports insidious onset without acute precipitating event.  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      No family history pertinent to patient's problem today.   **      Pain location varies. Describes as soreness throughout joints. Severity waxes and wanes but at its worst is a 10/10. Affecting his sleep. Ice helps some but otherwise not much changes the pain. Includes ankles, wrists, elbows, shoulders, and knees bilaterally. Also, endorses some neck and jaw pain. Hip and back are spared. Has not had numerous joints hurt like this in the past.    No open sores or wounds on skin  Reports no risk of STI exposure  No ill contacts    No known family hx of rheumatologic " "conditions  Father does have a history of gout    REVIEW OF SYSTEMS:  Review of Systems  CONSTITUTIONAL:NEGATIVE for fever, chills, change in weight  INTEGUMENTARY/SKIN: NEGATIVE for worrisome rashes or lesions  RESP:NEGATIVE for SOB/dyspnea  CV: NEGATIVE for chest pain/chest pressure  GI: NEGATIVE for constipation, diarrhea, nausea and weight loss  : NEGATIVE for dysuria, hesitancy and retention  MUSCULOSKELETAL: POSITIVE  for joint pain as described in HPI and neck pain and NEGATIVE for joint swelling, joint warmth and myalgias  NEURO: POSITIVE for numbness or tingling and burning of hands bilaterally, Hx of carpal tunnel syndrome bilaterally and NEGATIVE for dizziness/lightheadedness  HEME/ALLERGY/IMMUNE: NEGATIVE for chills, fever, night sweats and weight loss    OBJECTIVE:  BP (!) 147/98   Ht 1.727 m (5' 8\")   Wt 122.5 kg (270 lb)   BMI 41.05 kg/m     General: healthy, alert and in no distress  HEENT: no scleral icterus or conjunctival erythema  Skin: no suspicious lesions or rash. No jaundice.  CV: distal perfusion of upper extremities intact, radial pulses 2+  Resp: normal respiratory effort without conversational dyspnea   Psych: normal mood and affect  Gait: normal steady gait with appropriate coordination and balance  Neuro: Normal light sensory exam of bilateral upper extremities    Hand/Wrist Musculoskeletal Exam    Inspection       Inspection - Right        Erythema: none      Ecchymosis: none      Edema: none      Deformity: none      Inspection - Left         Erythema: none      Ecchymosis: none      Edema: none      Deformity: none    Palpation      Palpation - Right         Right wrist palpation is normal.      Palpation - Left         Left wrist palpation is normal.    Range of Motion      Range of Motion - Right Hand         Right hand range of motion is normal.      Range of Motion - Left Hand         Left hand range of motion is normal.      Range of Motion - Right Wrist         Right wrist " range of motion is normal.      Range of Motion - Left Wrist         Left wrist range of motion is normal.      Range of motion additional comments: He notes tightness with ROM, especially composite digit flexion    Strength      Strength - Right Hand         Right hand strength is normal.      Strength - Left Hand         Left hand strength is normal.      Strength - Right Wrist         Right wrist strength is normal.      Strength - Left Wrist         Left wrist strength is normal.    Neurovascular      Neurovascular - Right         Radial pulse: 2+      Capillary refill: <3 sec      Neurovascular - Left         Radial pulse: 2+      Capillary refill: <3 sec    Special Tests      Special Tests - Right        Phalen's: positive      Tinel's - carpal tunnel: negative      Special Tests - Left        Phalen's: positive      Tinel's - carpal tunnel: negative      RADIOLOGY:  None today.      Labs:      URIC ACID, SERUM (03/05/2021 10:16 AM CST)  URIC ACID, SERUM (03/05/2021 10:16 AM CST)   Component Value Ref Range Performed At Pathologist Signature   URIC ACID 8.5 (H) 3.5 - 7.2 mg/dL [facility]          Review of prior external note(s) from - ED  Review of the result(s) of each unique test - lab  Independent interpretation of a test performed by another physician/other qualified health care professional (not separately reported) - lab  30 minutes spent on the date of the encounter doing chart review, history and exam, documentation and further activities as noted above             Again, thank you for allowing me to participate in the care of your patient.        Sincerely,        Sean Posey DO

## 2021-03-15 NOTE — PATIENT INSTRUCTIONS
Lab work ordered today  Clinic will contact you with lab results  Work note provided  Oral steroid may be indicated next, pending lab results.         If you have any further questions for your physician or physician s care team you can call [phone] and use option 3 to leave a voice message. Calls received during business hours will be returned same day.

## 2021-03-16 ENCOUNTER — TELEPHONE (OUTPATIENT)
Dept: ORTHOPEDICS | Facility: CLINIC | Age: 26
End: 2021-03-16

## 2021-03-16 ENCOUNTER — AMBULATORY - HEALTHEAST (OUTPATIENT)
Dept: ADMINISTRATIVE | Facility: OTHER | Age: 26
End: 2021-03-16

## 2021-03-16 DIAGNOSIS — M25.50 ARTHRALGIA, UNSPECIFIED JOINT: ICD-10-CM

## 2021-03-16 LAB
BASOPHILS # BLD AUTO: 0.1 10E9/L (ref 0–0.2)
BASOPHILS NFR BLD AUTO: 0.6 %
CRP SERPL-MCNC: 19 MG/L (ref 0–8)
DIFFERENTIAL METHOD BLD: NORMAL
EOSINOPHIL # BLD AUTO: 0.5 10E9/L (ref 0–0.7)
EOSINOPHIL NFR BLD AUTO: 6.1 %
ERYTHROCYTE [DISTWIDTH] IN BLOOD BY AUTOMATED COUNT: 13.8 % (ref 10–15)
ERYTHROCYTE [SEDIMENTATION RATE] IN BLOOD BY WESTERGREN METHOD: 18 MM/H (ref 0–15)
HCT VFR BLD AUTO: 43.9 % (ref 40–53)
HGB BLD-MCNC: 14.4 G/DL (ref 13.3–17.7)
LYMPHOCYTES # BLD AUTO: 2.1 10E9/L (ref 0.8–5.3)
LYMPHOCYTES NFR BLD AUTO: 27 %
MCH RBC QN AUTO: 28.3 PG (ref 26.5–33)
MCHC RBC AUTO-ENTMCNC: 32.8 G/DL (ref 31.5–36.5)
MCV RBC AUTO: 86 FL (ref 78–100)
MONOCYTES # BLD AUTO: 0.6 10E9/L (ref 0–1.3)
MONOCYTES NFR BLD AUTO: 8 %
NEUTROPHILS # BLD AUTO: 4.6 10E9/L (ref 1.6–8.3)
NEUTROPHILS NFR BLD AUTO: 58.3 %
PLATELET # BLD AUTO: 250 10E9/L (ref 150–450)
RBC # BLD AUTO: 5.08 10E12/L (ref 4.4–5.9)
URATE SERPL-MCNC: 8.4 MG/DL (ref 3.5–7.2)
WBC # BLD AUTO: 7.9 10E9/L (ref 4–11)

## 2021-03-16 PROCEDURE — 86038 ANTINUCLEAR ANTIBODIES: CPT | Performed by: PEDIATRICS

## 2021-03-16 PROCEDURE — 86431 RHEUMATOID FACTOR QUANT: CPT | Performed by: PEDIATRICS

## 2021-03-16 PROCEDURE — 36415 COLL VENOUS BLD VENIPUNCTURE: CPT | Performed by: PEDIATRICS

## 2021-03-16 PROCEDURE — 85652 RBC SED RATE AUTOMATED: CPT | Performed by: PEDIATRICS

## 2021-03-16 PROCEDURE — 85025 COMPLETE CBC W/AUTO DIFF WBC: CPT | Performed by: PEDIATRICS

## 2021-03-16 PROCEDURE — 86140 C-REACTIVE PROTEIN: CPT | Performed by: PEDIATRICS

## 2021-03-16 PROCEDURE — 86618 LYME DISEASE ANTIBODY: CPT | Performed by: PEDIATRICS

## 2021-03-16 PROCEDURE — 84550 ASSAY OF BLOOD/URIC ACID: CPT | Performed by: PEDIATRICS

## 2021-03-16 NOTE — TELEPHONE ENCOUNTER
Called and spoke with patient, he states he is having his labs checked today.  He was asking about joint steroid injection, informed patient that oer office visit yesterday Dr. Posey recommended possible oral steroids pending lab results.  Informed patient that once we have lab results Dr. Posey would review and we would reach out to patient to notify and make a plan from there.  Patient verbalized understanding.    Patrizia Ceja, VIC    3/16/2021 at 10:21 AM

## 2021-03-16 NOTE — TELEPHONE ENCOUNTER
[address] [place] [address] [facility] 
[address] [facility] [id number] [phone] Work/School Note Date: 10/7/2019 To Whom It May concern: 
 
Prabha Diaz was seen and treated today in the emergency room by the following provider(s): 
Attending Provider: Ishaan Rosario DO Physician Assistant: YASMIN Cheung. Prabha Diaz may return to work on 10/8/19. Sincerely, YASMINE Dickinson RN 
 
 
 
 Patient is having increased wrist pain, he has lab work today in [place]. Would like to know when he can get in for injections as soon as he can.

## 2021-03-17 LAB
ANA SER QL IF: NEGATIVE
B BURGDOR IGG+IGM SER QL: 0.1 (ref 0–0.89)
RHEUMATOID FACT SER NEPH-ACNC: <7 IU/ML (ref 0–20)

## 2021-03-19 ENCOUNTER — TELEPHONE (OUTPATIENT)
Dept: ORTHOPEDICS | Facility: CLINIC | Age: 26
End: 2021-03-19

## 2021-03-19 NOTE — TELEPHONE ENCOUNTER
Patient called, was supposed to have script sent to pharmacy yesterday per conversation he had yesterday. Pharmacy ([facility] in [place], [address]) did not receive script.  Please call him ASAP for update.

## 2021-03-19 NOTE — TELEPHONE ENCOUNTER
Spoke with patient. Relayed that Prednisone was ordered and he verified that pharmacy had just called to say prescription was ready for pickup. Patient will call us after Prednisone course to discuss next steps, specifically CTS surgery possibility. No further questions. Naye Blanco, ATC

## 2021-04-18 ENCOUNTER — HEALTH MAINTENANCE LETTER (OUTPATIENT)
Age: 26
End: 2021-04-18

## 2021-04-20 ENCOUNTER — OFFICE VISIT (OUTPATIENT)
Dept: ORTHOPEDICS | Facility: CLINIC | Age: 26
End: 2021-04-20
Attending: PEDIATRICS
Payer: COMMERCIAL

## 2021-04-20 DIAGNOSIS — G56.03 BILATERAL CARPAL TUNNEL SYNDROME: Primary | ICD-10-CM

## 2021-04-20 DIAGNOSIS — G56.03 CARPAL TUNNEL SYNDROME, BILATERAL: ICD-10-CM

## 2021-04-20 PROCEDURE — 99203 OFFICE O/P NEW LOW 30 MIN: CPT | Performed by: PLASTIC SURGERY

## 2021-04-20 ASSESSMENT — PAIN SCALES - GENERAL: PAINLEVEL: NO PAIN (0)

## 2021-04-20 NOTE — LETTER
4/20/2021         RE: Lauri Ma  [address]  [facility] [address]        Dear Colleague,    Thank you for referring your patient, Lauri Ma, to the [facility]. Please see a copy of my visit note below.    Service Date: 04/20/2021      REFERRING PROVIDER:  Sean Posey DO      PRESENTING COMPLAINT:  Consultation for numbness, tingling in the hands.      HISTORY OF PRESENTING COMPLAINT:  Mr. Ma is 25 years old, right-hand dominant.  He has been having numbness, tingling in the median innervated fingers for multiple years.  It has become more constant now.  He has night symptoms.  He has used splints that do not help anymore, has had cortisone shots that do not help anymore.  He has had nerve conduction tests in 2018 that showed moderately severe right carpal tunnel syndrome and mild left carpal tunnel syndrome.  He has no neck pain, no radiculopathy, no lower extremity symptoms.      PAST MEDICAL HISTORY:  Hypertension.      PAST SURGICAL HISTORY:  Nil.      MEDICATIONS:  Lisinopril/hydrochlorothiazide.      ALLERGIES:  Nil.      SOCIAL HISTORY:  Does not smoke, socially drinks.  Works in fiberoptic technology.  Lives in [place].      REVIEW OF SYSTEMS:  Denies chest pain, shortness of breath, MI, CVA, DVT and PE.      PHYSICAL EXAMINATION:  Vital signs are stable.  He is afebrile, in no obvious distress.  He has no thenar atrophy.  He has a positive Phalen's, positive carpal compression test.  He has positive Tinel at the wrist, but negative in the other areas.  Median innervated fingers are dull.  Ulnar innervated are sharp.  Thenar is sharp.      ASSESSMENT AND PLAN:  Based on above findings, a diagnosis of carpal tunnel syndrome was made.  Advised surgery given his worsening symptoms starting with the right, then going to the left.  Risks of pain, infection, bleeding, scarring, asymmetry, seromas, hematomas, wound breakdown, wound dehiscence, injury to deeper  structures, stiffness, CRPS, inability to promise all his symptoms will go away given the moderately severe nature of his disease, DVT, PE, MI, CVA, pneumonia, renal failure and death were explained.  He understood them all and wants to proceed.  I look forward to helping him out in the near future.  We will start on the right and then go to the left.  All questions were answered.  He was happy with the visit.      Total time spent in chart review, the visit itself and post-visit paperwork was 30 minutes.         BRITTA HIRSCH MD             D: 2021   T: 2021   MT: DAYSI      Name:     NESS MELGAR   MRN:      [id number]        Account:      [id number]   :      1995           Service Date: 2021      Document: [id number]        Again, thank you for allowing me to participate in the care of your patient.        Sincerely,        BRITTA Hirsch MD

## 2021-04-20 NOTE — PROGRESS NOTES
Service Date: 04/20/2021      REFERRING PROVIDER:  Sean Posey DO      PRESENTING COMPLAINT:  Consultation for numbness, tingling in the hands.      HISTORY OF PRESENTING COMPLAINT:  Mr. Ma is 25 years old, right-hand dominant.  He has been having numbness, tingling in the median innervated fingers for multiple years.  It has become more constant now.  He has night symptoms.  He has used splints that do not help anymore, has had cortisone shots that do not help anymore.  He has had nerve conduction tests in 2018 that showed moderately severe right carpal tunnel syndrome and mild left carpal tunnel syndrome.  He has no neck pain, no radiculopathy, no lower extremity symptoms.      PAST MEDICAL HISTORY:  Hypertension.      PAST SURGICAL HISTORY:  Nil.      MEDICATIONS:  Lisinopril/hydrochlorothiazide.      ALLERGIES:  Nil.      SOCIAL HISTORY:  Does not smoke, socially drinks.  Works in fiberoptic technology.  Lives in [place].      REVIEW OF SYSTEMS:  Denies chest pain, shortness of breath, MI, CVA, DVT and PE.      PHYSICAL EXAMINATION:  Vital signs are stable.  He is afebrile, in no obvious distress.  He has no thenar atrophy.  He has a positive Phalen's, positive carpal compression test.  He has positive Tinel at the wrist, but negative in the other areas.  Median innervated fingers are dull.  Ulnar innervated are sharp.  Thenar is sharp.      ASSESSMENT AND PLAN:  Based on above findings, a diagnosis of carpal tunnel syndrome was made.  Advised surgery given his worsening symptoms starting with the right, then going to the left.  Risks of pain, infection, bleeding, scarring, asymmetry, seromas, hematomas, wound breakdown, wound dehiscence, injury to deeper structures, stiffness, CRPS, inability to promise all his symptoms will go away given the moderately severe nature of his disease, DVT, PE, MI, CVA, pneumonia, renal failure and death were explained.  He understood them all and wants to proceed.  I  look forward to helping him out in the near future.  We will start on the right and then go to the left.  All questions were answered.  He was happy with the visit.      Total time spent in chart review, the visit itself and post-visit paperwork was 30 minutes.         BRITTA MADDOX MD             D: 2021   T: 2021   MT: DAYSI      Name:     NESS MELGAR   MRN:      [id number]        Account:      [id number]   :      1995           Service Date: 2021      Document: [id number]

## 2021-04-21 ENCOUNTER — TELEPHONE (OUTPATIENT)
Dept: ORTHOPEDICS | Facility: CLINIC | Age: 26
End: 2021-04-21

## 2021-04-21 NOTE — TELEPHONE ENCOUNTER
Procedure: Right open carpal tunnel release  [place] [facility]  [place]: 20 minutes  Anesthesia: Local  Post-op appointments needed: 2.5 weeks provider only  Surgery packet/instructions given to patient?  to be mailed    Solomon Meredith RN

## 2021-04-23 PROBLEM — G56.03 BILATERAL CARPAL TUNNEL SYNDROME: Status: ACTIVE | Noted: 2021-04-23

## 2021-04-23 NOTE — TELEPHONE ENCOUNTER
Date Scheduled: 6-18-21  Facility: [facility]  Surgeon: Dr. Hirsch   Post-op appointment scheduled:    scheduled?: No  Surgery packet/instructions confirmed received?  No-please mail  Special Considerations:   Alethea Ortega, Surgery Scheduling Coordinator

## 2021-05-13 DIAGNOSIS — I10 ESSENTIAL HYPERTENSION: ICD-10-CM

## 2021-05-13 RX ORDER — LISINOPRIL AND HYDROCHLOROTHIAZIDE 12.5; 2 MG/1; MG/1
1 TABLET ORAL DAILY
Qty: 90 TABLET | Refills: 0 | Status: SHIPPED | OUTPATIENT
Start: 2021-05-13 | End: 2021-06-11

## 2021-05-13 RX ORDER — LISINOPRIL AND HYDROCHLOROTHIAZIDE 12.5; 2 MG/1; MG/1
1 TABLET ORAL DAILY
Qty: 180 TABLET | Refills: 0 | Status: SHIPPED | OUTPATIENT
Start: 2021-05-13 | End: 2021-05-13

## 2021-05-13 NOTE — TELEPHONE ENCOUNTER
Patient calling to request refill of his lisinopril-hydrochlorothiazide   Medication is being filled for 1 time refill only due to:  Patient needs labs see failed protocol. Patient needs to be seen because has been over 1 year since last seen for chronic disease management, HTN.    Refilled quantity as previously written x 1 but then noticed that it was a 180 day supply  Resent for a fernando refill of 90 day supply instead    Appointment scheduled for 5/27/2021      Cheyanne Grullon RN  [facility]

## 2021-05-26 DIAGNOSIS — Z11.59 ENCOUNTER FOR SCREENING FOR OTHER VIRAL DISEASES: ICD-10-CM

## 2021-05-27 ENCOUNTER — OFFICE VISIT (OUTPATIENT)
Dept: FAMILY MEDICINE | Facility: CLINIC | Age: 26
End: 2021-05-27
Payer: COMMERCIAL

## 2021-05-27 VITALS
WEIGHT: 301 LBS | SYSTOLIC BLOOD PRESSURE: 130 MMHG | OXYGEN SATURATION: 97 % | BODY MASS INDEX: 45.77 KG/M2 | DIASTOLIC BLOOD PRESSURE: 90 MMHG | HEART RATE: 91 BPM | TEMPERATURE: 98.6 F

## 2021-05-27 DIAGNOSIS — E66.01 MORBID OBESITY (H): ICD-10-CM

## 2021-05-27 DIAGNOSIS — R73.03 PREDIABETES: ICD-10-CM

## 2021-05-27 DIAGNOSIS — Z13.6 CARDIOVASCULAR SCREENING; LDL GOAL LESS THAN 130: ICD-10-CM

## 2021-05-27 DIAGNOSIS — Z11.59 NEED FOR HEPATITIS C SCREENING TEST: ICD-10-CM

## 2021-05-27 DIAGNOSIS — I10 ESSENTIAL HYPERTENSION: Primary | ICD-10-CM

## 2021-05-27 PROCEDURE — 99214 OFFICE O/P EST MOD 30 MIN: CPT | Performed by: NURSE PRACTITIONER

## 2021-05-27 RX ORDER — LISINOPRIL AND HYDROCHLOROTHIAZIDE 20; 25 MG/1; MG/1
1 TABLET ORAL DAILY
Qty: 90 TABLET | Refills: 3 | Status: SHIPPED | OUTPATIENT
Start: 2021-05-27 | End: 2022-07-07

## 2021-05-27 RX ORDER — LISINOPRIL AND HYDROCHLOROTHIAZIDE 12.5; 2 MG/1; MG/1
1 TABLET ORAL DAILY
Qty: 90 TABLET | Refills: 0 | Status: CANCELLED | OUTPATIENT
Start: 2021-05-27

## 2021-05-27 NOTE — PROGRESS NOTES
"Assessment & Plan     Essential hypertension  DMe for home BP monitor- he is to check BP twice a week, keep log and return to clinic 3 weeks for BP recheck, reviewed low salt diet, benefits of weight loss, regular exercise, adequate fluid intake  - Home Blood Pressure Monitor Order for DME - ONLY FOR DME  - lisinopril-hydrochlorothiazide (ZESTORETIC) 20-25 MG tablet  Dispense: 90 tablet; Refill: 3  - **Basic metabolic panel FUTURE anytime  - Albumin Random Urine Quantitative with Creat Ratio    Morbid obesity (H)  Benefits of weight loss reviewed in detail, encouraged him to cut back on the carbohydrates in the diet, consume more fruits and vegetables, drink plenty of water, avoid fruit juices, sodas, get 150 min moderate exercise/week.  Recheck weight in 6 months.    Prediabetes  Checking labs, encouraged weight loss, regular exercise, cutting back on the carbs in the diet.  - **Basic metabolic panel FUTURE anytime  - **A1C FUTURE anytime    Need for hepatitis C screening test    - **Hepatitis C Screen Reflex to RNA FUTURE anytime    CARDIOVASCULAR SCREENING; LDL GOAL LESS THAN 130    - Lipid panel reflex to direct LDL Fasting      BMI:   Estimated body mass index is 45.77 kg/m  as calculated from the following:    Height as of 3/15/21: 1.727 m (5' 8\").    Weight as of this encounter: 136.5 kg (301 lb).   Weight management plan: Discussed healthy diet and exercise guidelines    Work on weight loss  Regular exercise  See Patient Instructions    Return in about 2 weeks (around 6/10/2021), or if symptoms worsen or fail to improve, for Follow up, preop visit.    VALERIE Kaur [facility]    Richard Carreon is a 26 year old who presents for the following health issues   HPI     Hypertension Follow-up      Do you check your blood pressure regularly outside of the clinic? No     Are you following a low salt diet? Yes    Are your blood pressures ever more than 140 on the top " number (systolic) OR more   than 90 on the bottom number (diastolic), for example 140/90? No-does not check it.       How many servings of fruits and vegetables do you eat daily?  2-3    On average, how many sweetened beverages do you drink each day (Examples: soda, juice, sweet tea, etc.  Do NOT count diet or artificially sweetened beverages)?   1    How many days per week do you exercise enough to make your heart beat faster? 0    How many minutes a day do you exercise enough to make your heart beat faster? 0  How many days per week do you miss taking your medication? 1    What makes it hard for you to take your medications?  remembering to take    He is not exercising regularly, has not lost weight but has been better about taking his medication in the last 2 weeks.      Review of Systems   Constitutional, HEENT, cardiovascular, pulmonary, gi and gu systems are negative, except as otherwise noted.      Objective    BP (!) 130/90 (BP Location: Right arm, Patient Position: Sitting, Cuff Size: Adult Regular)   Pulse 91   Temp 98.6  F (37  C) (Tympanic)   Wt 136.5 kg (301 lb)   SpO2 97%   BMI 45.77 kg/m    Body mass index is 45.77 kg/m .  Physical Exam   GENERAL: healthy, alert and no distress  EYES: Eyes grossly normal to inspection, PERRL and conjunctivae and sclerae normal  HENT: ear canals and TM's normal, nose and mouth without ulcers or lesions  NECK: no adenopathy, no asymmetry, masses, or scars and thyroid normal to palpation, no bruits  RESP: lungs clear to auscultation - no rales, rhonchi or wheezes  CV: regular rate and rhythm, normal S1 S2, no S3 or S4, no murmur, click or rub, no peripheral edema and peripheral pulses strong  ABDOMEN: soft, nontender, no hepatosplenomegaly, no masses and bowel sounds normal  MS: no gross musculoskeletal defects noted, no edema  SKIN: no suspicious lesions or rashes  NEURO: Normal strength and tone, mentation intact and speech normal  PSYCH: mentation appears normal,  affect normal/bright  LYMPH: no cervical adenopathy

## 2021-06-10 DIAGNOSIS — Z11.59 NEED FOR HEPATITIS C SCREENING TEST: ICD-10-CM

## 2021-06-10 DIAGNOSIS — I10 ESSENTIAL HYPERTENSION: ICD-10-CM

## 2021-06-10 DIAGNOSIS — R73.03 PREDIABETES: ICD-10-CM

## 2021-06-10 DIAGNOSIS — Z13.6 CARDIOVASCULAR SCREENING; LDL GOAL LESS THAN 130: ICD-10-CM

## 2021-06-10 LAB
ANION GAP SERPL CALCULATED.3IONS-SCNC: 7 MMOL/L (ref 3–14)
BUN SERPL-MCNC: 18 MG/DL (ref 7–30)
CALCIUM SERPL-MCNC: 9 MG/DL (ref 8.5–10.1)
CHLORIDE SERPL-SCNC: 102 MMOL/L (ref 94–109)
CHOLEST SERPL-MCNC: 168 MG/DL
CO2 SERPL-SCNC: 27 MMOL/L (ref 20–32)
CREAT SERPL-MCNC: 1.2 MG/DL (ref 0.66–1.25)
CREAT UR-MCNC: 391 MG/DL
GFR SERPL CREATININE-BSD FRML MDRD: 82 ML/MIN/{1.73_M2}
GLUCOSE SERPL-MCNC: 87 MG/DL (ref 70–99)
HBA1C MFR BLD: 5.9 % (ref 0–5.6)
HCV AB SERPL QL IA: NONREACTIVE
HDLC SERPL-MCNC: 42 MG/DL
LDLC SERPL CALC-MCNC: 92 MG/DL
MICROALBUMIN UR-MCNC: 28 MG/L
MICROALBUMIN/CREAT UR: 7.26 MG/G CR (ref 0–17)
NONHDLC SERPL-MCNC: 126 MG/DL
POTASSIUM SERPL-SCNC: 3.6 MMOL/L (ref 3.4–5.3)
SODIUM SERPL-SCNC: 136 MMOL/L (ref 133–144)
TRIGL SERPL-MCNC: 172 MG/DL

## 2021-06-10 PROCEDURE — 36415 COLL VENOUS BLD VENIPUNCTURE: CPT | Performed by: NURSE PRACTITIONER

## 2021-06-10 PROCEDURE — 86803 HEPATITIS C AB TEST: CPT | Performed by: NURSE PRACTITIONER

## 2021-06-10 PROCEDURE — 80061 LIPID PANEL: CPT | Performed by: NURSE PRACTITIONER

## 2021-06-10 PROCEDURE — 80048 BASIC METABOLIC PNL TOTAL CA: CPT | Performed by: NURSE PRACTITIONER

## 2021-06-10 PROCEDURE — 82043 UR ALBUMIN QUANTITATIVE: CPT | Performed by: NURSE PRACTITIONER

## 2021-06-10 PROCEDURE — 83036 HEMOGLOBIN GLYCOSYLATED A1C: CPT | Performed by: NURSE PRACTITIONER

## 2021-06-11 ENCOUNTER — OFFICE VISIT (OUTPATIENT)
Dept: FAMILY MEDICINE | Facility: CLINIC | Age: 26
End: 2021-06-11
Payer: COMMERCIAL

## 2021-06-11 VITALS
DIASTOLIC BLOOD PRESSURE: 86 MMHG | SYSTOLIC BLOOD PRESSURE: 117 MMHG | WEIGHT: 296 LBS | TEMPERATURE: 97.2 F | BODY MASS INDEX: 44.86 KG/M2 | OXYGEN SATURATION: 98 % | HEIGHT: 68 IN | HEART RATE: 89 BPM

## 2021-06-11 DIAGNOSIS — E66.01 MORBID OBESITY (H): ICD-10-CM

## 2021-06-11 DIAGNOSIS — G56.03 BILATERAL CARPAL TUNNEL SYNDROME: ICD-10-CM

## 2021-06-11 DIAGNOSIS — Z01.818 PREOP GENERAL PHYSICAL EXAM: Primary | ICD-10-CM

## 2021-06-11 DIAGNOSIS — R06.83 SNORING: ICD-10-CM

## 2021-06-11 DIAGNOSIS — R73.03 PREDIABETES: ICD-10-CM

## 2021-06-11 DIAGNOSIS — Z91.013 SHELLFISH ALLERGY: ICD-10-CM

## 2021-06-11 DIAGNOSIS — I10 ESSENTIAL HYPERTENSION: ICD-10-CM

## 2021-06-11 PROCEDURE — 99214 OFFICE O/P EST MOD 30 MIN: CPT | Performed by: NURSE PRACTITIONER

## 2021-06-11 RX ORDER — EPINEPHRINE 0.3 MG/.3ML
0.3 INJECTION SUBCUTANEOUS PRN
Qty: 1 EACH | Refills: 1 | Status: SHIPPED | OUTPATIENT
Start: 2021-06-11

## 2021-06-11 ASSESSMENT — MIFFLIN-ST. JEOR: SCORE: 2287.15

## 2021-06-11 ASSESSMENT — PAIN SCALES - GENERAL: PAINLEVEL: NO PAIN (0)

## 2021-06-11 NOTE — PATIENT INSTRUCTIONS
At [facility], we strive to deliver an exceptional experience to you, every time we see you. If you receive a survey, please complete it as we do value your feedback.  If you have MyChart, you can expect to receive results automatically within 24 hours of their completion.  Your provider will send a note interpreting your results as well.   If you do not have MyChart, you should receive your results in about a week by mail.    Your care team:                            Family Medicine Internal Medicine   MD Leonides Sandy MD Shantel Branch-Fleming, MD Srinivasa Vaka, MD Katya Belousova, PAGIRISH Cruz, APRN CNP    Braxton Calabrese, MD Pediatrics   Eugenio Quiroga, PAGIRISH Chandler, CNP MD Aaliyah Sahu APRN CNP   MD Ave Huang MD Deborah Mielke, MD Cat Zelaya, APRN [facility]      Clinic hours: Monday - Thursday 7 am-6 pm; Fridays 7 am-5 pm.   Urgent care: Monday - Friday 10 am- 8 pm; Saturday and Sunday 9 am-5 pm.    Clinic: [phone]       [facility]: Monday - Thursday 8 am - 7 pm; Friday 8 am - 6 pm  [facility]: [phone]     Use [email or web address] for 24/7 diagnosis and treatment of dozens of conditions.    Preparing for Your Surgery  Getting started  A nurse will call you to review your health history and instructions. They will give you an arrival time based on your scheduled surgery time.  Please be ready to share the following:    Your doctor's clinic name and phone number    Your medical, surgical and anesthesia history    A list of allergies and sensitivities    A list of medicines, including herbal treatments and over-the-counter drugs    Whether the patient has a legal guardian (ask how to send us the papers in advance)  If you have a child who's having surgery, please ask for a copy of Preparing for Your Child's Surgery.    Preparing for  surgery    Within 30 days of surgery: Have a pre-op exam (sometimes called an H&P, or History and Physical). This can be done at a clinic or pre-operative center.  ? If you're having a , you may not need this exam. Talk to your care team    At your pre-op exam, talk to your care team about all medicines you take. If you need to stop any medicines before surgery, ask when to start taking them again.  ? We do this for your safety. Many medicines can make you bleed too much during surgery. Some change how well surgery (anesthesia) drugs work.    Call your insurance company to let them know you're having surgery. (If you don't have insurance, call [phone].)    Call your clinic if there's any change in your health. This includes signs of a cold or flu (sore throat, runny nose, cough, rash, fever). It also includes a scrape or scratch near the surgery site.    If you have questions on the day of surgery, call your hospital or surgery center.  Eating and drinking guidelines  For your safety: Unless your surgeon tells you otherwise, follow the guidelines below.    Eat and drink as usual until 8 hours before surgery. After that, no food or milk.    Drink clear liquids until 2 hours before surgery. These are liquids you can see through, like water, Gatorade and Propel Water. You may also have black coffee and tea (no cream or milk).    Nothing by mouth within 2 hours of surgery. This includes gum, candy and breath mints.    If you drink, stop drinking alcohol the night before surgery.    If your care team tells you to take medicine on the morning of surgery, it's okay to take it with a sip of water.  Preventing infection    Shower or bathe the night before and morning of your surgery. Follow the instructions your clinic gave you. (If no instructions, use regular soap.)    Don't shave or clip hair near your surgery site. We'll remove the hair if needed.    Don't smoke or vape the morning of surgery. You may chew  nicotine gum up to 2 hours before surgery. A nicotine patch is okay.  ? Note: Some surgeries require you to completely quit smoking and nicotine. Check with your surgeon.    Your care team will make every effort to keep you safe from infection. We will:  ? Clean our hands often with soap and water (or an alcohol-based hand rub).  ? Clean the skin at your surgery site with a special soap that kills germs.  ? Give you a special gown to keep you warm. (Cold raises the risk of infection.)  ? Wear special hair covers, masks, gowns and gloves during surgery.  ? Give antibiotic medicine, if prescribed. Not all surgeries need antibiotics.  What to bring on the day of surgery    Photo ID and insurance card    Copy of your health care directive, if you have one    Glasses and hearing aides (bring cases)  ? You can't wear contacts during surgery    Inhaler and eye drops, if you use them (tell us about these when you arrive)    CPAP machine or breathing device, if you use them    A few personal items, if spending the night    If you have . . .  ? A pacemaker or ICD (cardiac defibrillator): Bring the ID card.  ? An implanted stimulator: Bring the remote control.  ? A legal guardian: Bring a copy of the certified (court-stamped) guardianship papers.  Please remove any jewelry, including body piercings. Leave jewelry and other valuables at home.  If you're going home the day of surgery  Important: If you don't follow the rules below, we must cancel your surgery.     Arrange for someone to drive you home after surgery. You may not drive, take a taxi or take public transportation by yourself (unless you'll have local anesthesia only).    Arrange for a responsible adult to stay with you overnight. If you don't, we may keep you in the hospital overnight, and you may need to pay the costs yourself.  Questions?   If you have any questions for your care team, list them here:  _________________________________________________________________________________________________________________________________________________________________________________________________________________________________________________________________________________________________________________________  For informational purposes only. Not to replace the advice of your health care provider. Copyright   2003, 2019 [facility]. All rights reserved. Clinically reviewed by Stacy Alonso MD. [employer] 075040 - REV 4/20.    Hold Zestoretic on the day of surgery, OK to restart it after surgery.

## 2021-06-11 NOTE — PROGRESS NOTES
[address] [address]  Phone: [phone]  Primary Provider: Rocío Camilo  Pre-op Performing Provider: ROCÍO CAMILO      PREOPERATIVE EVALUATION:  Today's date: 6/11/2021    Lauri aM is a 26 year old male who presents for a preoperative evaluation.    Surgical Information:  Surgery/Procedure: Right Hand Carpal  Surgery Location: [facility]  Surgeon: Dr. Hirsch  Surgery Date: 6/18/21  Time of Surgery: Noon  Where patient plans to recover: At home with family  Fax number for surgical facility: Note does not need to be faxed, will be available electronically in Epic.    Type of Anesthesia Anticipated: to be determined    Assessment & Plan     The proposed surgical procedure is considered INTERMEDIATE risk.    Preop general physical exam      Bilateral carpal tunnel syndrome  Having right carpal tunnel release on 6/18/21.    Morbid obesity (H)  Benefits of weight loss reviewed in detail, encouraged him to cut back on the carbohydrates in the diet, consume more fruits and vegetables, drink plenty of water, avoid fruit juices, sodas, get 150 min moderate exercise/week. Referring to Nutrition to work on weight loss, prediabetes Recheck weight in 6 months.      Essential hypertension  BP well controlled on Zestoretic 20-25 mg daily, continue to work on weight loss, regular exercise, heart healthy diet.    Prediabetes  Referring to Nutrition to work on diet  - NUTRITION REFERRAL    Snoring  STOP BANG score=4, moderate sleep apnea risk.    Shellfish allergy    - EPINEPHrine (ANY BX GENERIC EQUIV) 0.3 MG/0.3ML injection 2-pack  Dispense: 1 each; Refill: 1      Possible Sleep Apnea: STOP-BANG score=4, moderate risk    Risks and Recommendations:  The patient has the following additional risks and recommendations for perioperative complications:   - Morbid obesity (BMI >40)    Medication Instructions:   - Diuretics: HOLD on the day  of surgery.  Hold Zestoretic on day of surgery, OK to resume after surgery  RECOMMENDATION:  APPROVAL GIVEN to proceed with proposed procedure, without further diagnostic evaluation.      16 minutes spent on the date of the encounter doing chart review, history and exam, documentation and further activities per the note        Subjective     HPI related to upcoming procedure: Patient has bilateral CTS and is planning for right CTS  Release on 6/18/21.    Preop Questions 6/11/2021   1. Have you ever had a heart attack or stroke? No   2. Have you ever had surgery on your heart or blood vessels, such as a stent placement, a coronary artery bypass, or surgery on an artery in your head, neck, heart, or legs? No   3. Do you have chest pain with activity? No   4. Do you have a history of  heart failure? No   5. Do you currently have a cold, bronchitis or symptoms of other infection? No   6. Do you have a cough, shortness of breath, or wheezing? No   7. Do you or anyone in your family have previous history of blood clots? UNKNOWN -    8. Do you or does anyone in your family have a serious bleeding problem such as prolonged bleeding following surgeries or cuts? No   9. Have you ever had problems with anemia or been told to take iron pills? No   10. Have you had any abnormal blood loss such as black, tarry or bloody stools? No   11. Have you ever had a blood transfusion? No   12. Are you willing to have a blood transfusion if it is medically needed before, during, or after your surgery? Yes   13. Have you or any of your relatives ever had problems with anesthesia? No   14. Do you have sleep apnea, excessive snoring or daytime drowsiness? YES - snoring- no apneic spells, daytime drowsiness, sleep is restorative   14a. Do you have a CPAP machine? No   15. Do you have any artifical heart valves or other implanted medical devices like a pacemaker, defibrillator, or continuous glucose monitor? No   16. Do you have artificial  joints? No   17. Are you allergic to latex? No       Health Care Directive:  Patient does not have a Health Care Directive or Living Will: Discussed advance care planning with patient; information given to patient to review.    Preoperative Review of :   reviewed - no record of controlled substances prescribed.      Status of Chronic Conditions:  See problem list for active medical problems.  Problems all longstanding and stable, except as noted/documented.  See ROS for pertinent symptoms related to these conditions.    HYPERTENSION - Patient has longstanding history of HTN , currently denies any symptoms referable to elevated blood pressure. Specifically denies chest pain, palpitations, dyspnea, orthopnea, PND or peripheral edema. Blood pressure readings have been in normal range. Current medication regimen is as listed below. Patient denies any side effects of medication.       Review of Systems  Constitutional, neuro, ENT, endocrine, pulmonary, cardiac, gastrointestinal, genitourinary, musculoskeletal, integument and psychiatric systems are negative, except as otherwise noted.    Patient Active Problem List    Diagnosis Date Noted     Prediabetes 05/27/2021     Priority: Medium     Bilateral carpal tunnel syndrome 04/23/2021     Priority: Medium     Added automatically from request for surgery [id number]       Essential hypertension 02/28/2019     Priority: Medium     Cervicalgia 02/25/2019     Priority: Medium     Asymptomatic hypertensive urgency 02/25/2019     Priority: Medium     Acanthosis nigricans, acquired 02/25/2019     Priority: Medium     Morbid obesity (H) 09/19/2018     Priority: Medium     Acromioclavicular joint separation      Priority: Medium     CARDIOVASCULAR SCREENING; LDL GOAL LESS THAN 130      Priority: Medium      Past Medical History:   Diagnosis Date     Acromioclavicular joint separation      CARDIOVASCULAR SCREENING; LDL GOAL LESS THAN 160      Past Surgical History:   Procedure  "Laterality Date     NO HISTORY OF SURGERY       Current Outpatient Medications   Medication Sig Dispense Refill     EPINEPHrine (ANY BX GENERIC EQUIV) 0.3 MG/0.3ML injection 2-pack Inject 0.3 mLs (0.3 mg) into the muscle as needed for anaphylaxis 1 each 1     lisinopril-hydrochlorothiazide (ZESTORETIC) 20-25 MG tablet Take 1 tablet by mouth daily 90 tablet 3     order for DME Equipment being ordered: Digital home blood pressure monitor kit (Patient not taking: Reported on 6/11/2021) 1 Device 0       Allergies   Allergen Reactions     Shrimp Anaphylaxis and Swelling     Shellfish Allergy      Other reaction(s): Angioedema     Shellfish-Derived Products      Other reaction(s): Angioedema        Social History     Tobacco Use     Smoking status: Never Smoker     Smokeless tobacco: Never Used   Substance Use Topics     Alcohol use: Yes     Comment: rarely     Family History   Problem Relation Age of Onset     Hypertension Mother      Hypertension Father      Cancer Maternal Grandmother      Cerebrovascular Disease Maternal Grandfather      Unknown/Adopted Paternal Grandmother      Unknown/Adopted Paternal Grandfather      Diabetes No family hx of      Hyperlipidemia No family hx of      Colon Cancer No family hx of      Prostate Cancer No family hx of      Anxiety Disorder No family hx of      Depression No family hx of      Asthma No family hx of      Thyroid Disease No family hx of      History   Drug Use No         Objective     /86   Pulse 89   Temp 97.2  F (36.2  C) (Tympanic)   Ht 1.727 m (5' 8\")   Wt 134.3 kg (296 lb)   SpO2 98%   BMI 45.01 kg/m      Physical Exam    GENERAL APPEARANCE: healthy, alert and no distress     EYES: EOMI,  PERRL     HENT: ear canals and TM's normal and nose and mouth without ulcers or lesions     NECK: no adenopathy, no asymmetry, masses, or scars and thyroid normal to palpation     RESP: lungs clear to auscultation - no rales, rhonchi or wheezes     CV: regular rates and " rhythm, normal S1 S2, no S3 or S4 and no murmur, click or rub     ABDOMEN:  soft, nontender, no HSM or masses and bowel sounds normal     MS: extremities normal- no gross deformities noted, no evidence of inflammation in joints, FROM in all extremities.     SKIN: no suspicious lesions or rashes     NEURO: Normal strength and tone, sensory exam grossly normal, mentation intact and speech normal     PSYCH: mentation appears normal. and affect normal/bright     LYMPHATICS: No cervical adenopathy    Recent Labs   Lab Test 06/10/21  0657 03/16/21  1504   HGB  --  14.4   PLT  --  250     --    POTASSIUM 3.6  --    CR 1.20  --    A1C 5.9*  --         Diagnostics:  No results found for this or any previous visit (from the past 24 hour(s)).   No EKG required, no history of coronary heart disease, significant arrhythmia, peripheral arterial disease or other structural heart disease.    Revised Cardiac Risk Index (RCRI):  The patient has the following serious cardiovascular risks for perioperative complications:   - No serious cardiac risks = 0 points     RCRI Interpretation: 0 points: Class I (very low risk - 0.4% complication rate)           Signed Electronically by: VALERIE Kaur CNP  Copy of this evaluation report is provided to requesting physician.

## 2021-06-14 DIAGNOSIS — Z11.59 ENCOUNTER FOR SCREENING FOR OTHER VIRAL DISEASES: ICD-10-CM

## 2021-06-14 LAB
SARS-COV-2 RNA RESP QL NAA+PROBE: NORMAL
SPECIMEN SOURCE: NORMAL

## 2021-06-14 PROCEDURE — U0003 INFECTIOUS AGENT DETECTION BY NUCLEIC ACID (DNA OR RNA); SEVERE ACUTE RESPIRATORY SYNDROME CORONAVIRUS 2 (SARS-COV-2) (CORONAVIRUS DISEASE [COVID-19]), AMPLIFIED PROBE TECHNIQUE, MAKING USE OF HIGH THROUGHPUT TECHNOLOGIES AS DESCRIBED BY CMS-2020-01-R: HCPCS | Performed by: PLASTIC SURGERY

## 2021-06-14 PROCEDURE — U0005 INFEC AGEN DETEC AMPLI PROBE: HCPCS | Performed by: PLASTIC SURGERY

## 2021-06-15 LAB
LABORATORY COMMENT REPORT: NORMAL
SARS-COV-2 RNA RESP QL NAA+PROBE: NEGATIVE
SPECIMEN SOURCE: NORMAL

## 2021-06-18 ENCOUNTER — HOSPITAL ENCOUNTER (OUTPATIENT)
Facility: AMBULATORY SURGERY CENTER | Age: 26
Discharge: HOME | End: 2021-06-18
Attending: PLASTIC SURGERY | Admitting: PLASTIC SURGERY
Payer: COMMERCIAL

## 2021-06-18 VITALS
TEMPERATURE: 98.1 F | DIASTOLIC BLOOD PRESSURE: 84 MMHG | OXYGEN SATURATION: 97 % | RESPIRATION RATE: 20 BRPM | SYSTOLIC BLOOD PRESSURE: 127 MMHG

## 2021-06-18 DIAGNOSIS — G56.03 BILATERAL CARPAL TUNNEL SYNDROME: ICD-10-CM

## 2021-06-18 PROCEDURE — G8918 PT W/O PREOP ORDER IV AB PRO: HCPCS

## 2021-06-18 PROCEDURE — 64721 CARPAL TUNNEL SURGERY: CPT | Mod: RT

## 2021-06-18 PROCEDURE — G8907 PT DOC NO EVENTS ON DISCHARG: HCPCS

## 2021-06-18 RX ORDER — AMOXICILLIN 250 MG
1-2 CAPSULE ORAL 2 TIMES DAILY
Qty: 30 TABLET | Refills: 0 | Status: SHIPPED | OUTPATIENT
Start: 2021-06-18 | End: 2023-01-10

## 2021-06-18 RX ORDER — OXYCODONE HYDROCHLORIDE 5 MG/1
5-10 TABLET ORAL EVERY 6 HOURS PRN
Qty: 8 TABLET | Refills: 0 | Status: SHIPPED | OUTPATIENT
Start: 2021-06-18 | End: 2023-01-10

## 2021-06-18 RX ORDER — ONDANSETRON 4 MG/1
4-8 TABLET, ORALLY DISINTEGRATING ORAL EVERY 8 HOURS PRN
Qty: 4 TABLET | Refills: 0 | Status: SHIPPED | OUTPATIENT
Start: 2021-06-18 | End: 2023-01-10

## 2021-06-18 NOTE — BRIEF OP NOTE
[facility]    Brief Operative Note    Pre-operative diagnosis: Bilateral carpal tunnel syndrome [G56.03]  Post-operative diagnosis Same as pre-operative diagnosis    Procedure: Procedure(s):  RELEASE, CARPAL TUNNEL, RIGHT  Surgeon: Surgeon(s) and Role:     * BRITTA Hirsch MD - Primary  Anesthesia: Local   Estimated blood loss: Minimal  Drains: None  Specimens: * No specimens in log *  Findings:   None.  Complications: None.  Implants: * No implants in log *

## 2021-06-18 NOTE — DISCHARGE INSTRUCTIONS
CARPAL AND ULNAR NERVE RELEASE POST-OPERATIVE INSTRUCTIONS    Instructions       Have someone drive you home after surgery and help you at home for 1-2      days.      Get plenty of rest.      Follow balanced diet.      Decreased activity may promote constipation, so you may want to add      more raw fruit to your diet, and be sure to increase fluid intake.      Take pain medication as prescribed. Do not take aspirin or any products      containing aspirin.      Do not drink alcohol when taking pain medications.      Even when not taking pain medications, no alcohol for 3 weeks as it      causes fluid retention.      If you are taking vitamins with iron, resume these as tolerated.      Do not smoke, as smoking delays healing and increases the risk of      complications.    Activities      Do not drive until you are no longer taking any pain medications      (narcotics).      Start walking as soon as possible, this helps to reduce swelling and       lowers the chance of blood clots.      Resume normal activities gradually.      Return to work in 1-2 days, depending upon extent of surgery      No strenuous work or stress on wound for 2-3 weeks.    Incision Care      Keep ACE wrap on for 3 days then discontinue. Keep dry until then with plastic bag. After 3 days may get wet. Every hour raise your hand above your head and pump your fist 10 times. Rewrap the ACE if it gets loose or too tight.       Avoid exposing scars to sun for at least 12 months.      Always use a strong sunblock, if sun exposure is unavoidable (SPF 50 or      greater).      Inspect daily for signs of infection.      No tub soaking, bathing, or swimming while sutures or drains are in place.      Keep area clean and dry for first 24 hours.     What to Expect      Some swelling and bruising.      May have slight bleeding from incision.  Apply 4 x 4 gauze with slight pressure to       control bleeding.      Skin grafts and flaps may take several weeks or  months to heal; a support garment or      bandage may be necessary for up to a year.    Appearance      Final results of surgery may take a year or more.    Follow-Up Care      If external sutures have been used, they will be removed in 5-14 days.    Please note my office will call you 1-2 business days after your procedure to check up on how you're doing and to schedule your post-operative appointment.        When to Call      If you have increased swelling or bruising.      If swelling and redness persist after a few days.      If you have increased redness along the incision.      If you have severe or increased pain not relieved by medication.      If you have any side effects to medications; such as, rash, nausea,      headache, vomiting.      If you have an oral temperature over 100.4 degrees.      If you have any yellowish or greenish drainage from the incisions or      notice a foul odor.      If you have bleeding from the incisions that is difficult to control with      light pressure.      If you have loss of feeling or motion.    For Medical Questions, Please Call:      [phone], Monday - Friday, 8 a.m. - 4:30 p.m.      After hours and on weekends, call Hospital Paging at [phone] and      ask for the Plastic Surgeon on call.

## 2021-06-18 NOTE — OP NOTE
PREOPERATIVE DIAGNOSIS:  Right carpal tunnel syndrome.       POSTOPERATIVE DIAGNOSIS: Right carpal tunnel syndrome.       PROCEDURES:  Right open carpal tunnel release.       SURGEON:  Paresh Hirsch MD.     RESIDENT: Ottoniel Lott MD.      ANESTHESIA:  Local.       COMPLICATIONS:  Nil.       DRAINS:  Nil.       SPECIMENS:  Nil.       BLOOD LOSS:  1 mL.       DESCRIPTION OF PROCEDURE:   After informed consent was obtained from the patient, the proper site was discussed with him and appropriately marked in the procedure room. He was placed in supine position with his knees comfortably flexed and pillows underneath them.  Preoperative antibiotics were given in the OR.  His right hand was then surgically prepped and draped in a standard surgical fashion. A sterile glove was placed over the hand. The glove was then cut to expose the volar wrist. I injected 1% lidocaine mixed 1:100,000 epinephrine 20 mL in the wrist area in the pre-op area.  An Esmarch was used to exsanguinate the hand and forearm and the tourniquet was elevated to 250 mmHg pressure.  I then made an incision over the proximal palm in line with the radial aspect of the ring finger.  I then bluntly dissected down to the palmar fascia, opened the palmar fascia and then identified the transverse carpal ligament.  I looked for any nerve-like structure passing superficial to the transcarpal ligament, which I did not see.  I then made an incision with a 15 blade through the transcarpal ligament, again in line with the ulnar aspect of the ring finger, in an effort to leave part of the transcarpal ligament over the underlying median nerve.  I then went ahead and distally cut the transcarpal ligament with a sharp pair of scissors until the deep fat of the hand was identified and the entire transcarpal ligament distally was released.  Then proximally under direct vision, I released the transcarpal ligament and the flexor retinaculum, again releasing the entire  median nerve in the process.  Within the carpal tunnel,  I did not see any masses.  I then let the tourniquet down.  Minimal bleeding was seen superficially.  No deep bleeding was seen.  It was controlled with bipolar cautery.  I then closed the incision with 3-0 nylon suture in an interrupted horizontal mattress fashion.  A sterile dressing and an ACE wrap were placed.  The patient tolerated the procedure well.  All counts were correct at the end of the case.  The patient was sent to recovery room in stable condition.

## 2021-06-28 ENCOUNTER — TELEPHONE (OUTPATIENT)
Dept: SURGERY | Facility: CLINIC | Age: 26
End: 2021-06-28

## 2021-06-28 NOTE — TELEPHONE ENCOUNTER
[facility]    Phone Message    May a detailed message be left on voicemail: yes     Reason for Call: Other: Lauri is calling in again with a question on if Dr. Hirsch would be the one to fill out an FMLA form for his job, or if he could have his primary doctor fill it out. Please call back to discuss.     Action Taken: Message routed to:  [facility] Plastic Surg    Travel Screening: Not Applicable

## 2021-06-28 NOTE — TELEPHONE ENCOUNTER
S/p Right open carpal tunnel release, sx: 6/18/21    Discussed time off and FMLA with Pt. Will send message to Dr. Hirsch to get order for left side and ask surgery scheduler to reach out to help hold a date at least 6 weeks from right side surgery. Letter drafted and in MyChart for Pt, FMLA paperwork completed and emailed back to Pt per his request.     FMLA off continuous from 6/18/21 - 7/6/21. Back at work on 7/7/21, may have intermittent flair ups for 6 weeks total from surgery.    Message also sent to surgery schedule to hold time.    Solomon Meredith RN

## 2021-06-28 NOTE — TELEPHONE ENCOUNTER
[facility] Call [facility]    Phone Message    May a detailed message be left on voicemail: yes     Reason for Call: Other: Lauri is calling in today asking for a call back. He states that he would like to know if it is alright for him to go back to work. Please call back as soon as possible to discuss.     Action Taken: Message routed to:  [facility] Surg    Travel Screening: Not Applicable

## 2021-06-28 NOTE — LETTER
June 29, 2021      RE: Lauri Ma  [address]  [place] [facility] [address]       To whom it may concern:    Lauri Ma is under my professional care. He is to be off work from 6/18/21 through 7/6/21. He may return to work on 7/7/21 with the follow restrictions:    No lift, carry, push, pull more than 10 pounds with right hand.  No use of ladders.  No use of power tools with right hand.    These restrictions are to be in effect through 7/30/21. At that time, he may return to work without restrictions.    Sincerely,      Paresh Hirsch MD

## 2021-07-06 ENCOUNTER — ALLIED HEALTH/NURSE VISIT (OUTPATIENT)
Dept: NURSING | Facility: CLINIC | Age: 26
End: 2021-07-06
Payer: COMMERCIAL

## 2021-07-06 DIAGNOSIS — G56.03 BILATERAL CARPAL TUNNEL SYNDROME: Primary | ICD-10-CM

## 2021-07-06 PROCEDURE — 99024 POSTOP FOLLOW-UP VISIT: CPT

## 2021-07-06 NOTE — PROGRESS NOTES
Lauri Barnardanamaria comes into clinic today at the request of Dr. Hirsch for removal of sutures.    S: The patient is status post Right open carpal tunnel release, sx: 6/18/21.   There has been no history of infection or drainage. Pt complains of some sensitivity over the incision and some residual tingling in finger tips, but otherwise doing very well.     O: 4 horizontal mattress sutures are seen located along the base of the right palm. Incision is well approximated, clean, dry, intact, and no signs of infection. Moderate swelling. Mild soreness.     A: Pt is healing well. Sutures are ready to be removed.    P: All sutures were easily removed today. Routine wound care discussed. Workability letter changed. The patient will follow up at 6 weeks post op. If there are any concerns or signs and symptoms of infection, patient will reach out to the clinic. Patient is agreeable with plan.    This service provided today was under the direct supervision of Dr. Aguirre, who was available if needed.    Solomon Meredith RN

## 2021-07-06 NOTE — LETTER
July 6, 2021      RE: Lauri Ma  [address]  [place] [facility] [address]       To whom it may concern:     Lauri Ma is under my professional care. He is to be off work from 6/18/21 through 7/11/21. He may return to work on 7/12/21 with the follow restrictions:     No lift, carry, push, pull more than 10 pounds with right hand.  No use of ladders.  No use of power tools with right hand.     These restrictions are to be in effect through 7/30/21. At that time, he may return to work without restrictions.    Sincerely,      Paresh Hirsch MD

## 2021-07-27 ENCOUNTER — OFFICE VISIT (OUTPATIENT)
Dept: ORTHOPEDICS | Facility: CLINIC | Age: 26
End: 2021-07-27
Payer: COMMERCIAL

## 2021-07-27 DIAGNOSIS — G56.03 CARPAL TUNNEL SYNDROME, BILATERAL: Primary | ICD-10-CM

## 2021-07-27 PROCEDURE — 99024 POSTOP FOLLOW-UP VISIT: CPT | Performed by: PLASTIC SURGERY

## 2021-07-27 NOTE — LETTER
7/27/2021         RE: Lauri Ma  [address]  [place] [address]        Dear Colleague,    Thank you for referring your patient, Lauri Ma, to the [facility]. Please see a copy of my visit note below.    POSTOPERATIVE VISIT NOTE    PRESENTING COMPLAINT:  Postoperative visit, status post right carpal tunnel release done 06/18/2021.    HISTORY OF PRESENTING COMPLAINT:  Mr. Ma is 26 years old.  He is about 6 weeks out from his surgery.  He has done well.  Symptoms improved.  No issues.    PHYSICAL EXAMINATION:  Vital signs are stable.  He is afebrile, in no obvious distress.  Everything has healed in well.    ASSESSMENT AND PLAN:  Based upon the above findings, a diagnosis of right carpal tunnel release was made.  He has no restrictions now. He should proceed with a left carpal tunnel release.  He will let me know when he wants to proceed.          Again, thank you for allowing me to participate in the care of your patient.        Sincerely,        [facility] Hirsch MD

## 2021-07-27 NOTE — PROGRESS NOTES
POSTOPERATIVE VISIT NOTE    PRESENTING COMPLAINT:  Postoperative visit, status post right carpal tunnel release done 06/18/2021.    HISTORY OF PRESENTING COMPLAINT:  Mr. Ma is 26 years old.  He is about 6 weeks out from his surgery.  He has done well.  Symptoms improved.  No issues.    PHYSICAL EXAMINATION:  Vital signs are stable.  He is afebrile, in no obvious distress.  Everything has healed in well.    ASSESSMENT AND PLAN:  Based upon the above findings, a diagnosis of right carpal tunnel release was made.  He has no restrictions now. He should proceed with a left carpal tunnel release.  He will let me know when he wants to proceed.

## 2021-07-27 NOTE — LETTER
July 27, 2021      RE: Lauri Ma  [address]  [facility] [address]       To whom it may concern:    Lauri Ma was seen in our clinic today. He is to remain off of work until 8/2/21. At that time, he may return to work without restrictions.     Sincerely,      BRITTA Hirsch MD

## 2021-07-27 NOTE — PATIENT INSTRUCTIONS
Thanks for coming today.  [facility]  [address] [place] [address]    To schedule future appointments in Ortho Clinic, you may call [phone].    To schedule ordered imaging by your provider:   Call [facility] [phone]    To schedule an injection ordered by your provider:  Call [facility] Injection scheduling line: [phone]  [facility] available online at:  [email or web address]    Please call if any further questions or concerns ([phone]).  Clinic hours 8 am to 5 pm.    Return to clinic (call) if symptoms worsen or fail to improve.

## 2021-10-02 ENCOUNTER — HEALTH MAINTENANCE LETTER (OUTPATIENT)
Age: 26
End: 2021-10-02

## 2022-05-14 ENCOUNTER — HEALTH MAINTENANCE LETTER (OUTPATIENT)
Age: 27
End: 2022-05-14

## 2022-09-03 ENCOUNTER — HEALTH MAINTENANCE LETTER (OUTPATIENT)
Age: 27
End: 2022-09-03

## 2023-01-10 ENCOUNTER — OFFICE VISIT (OUTPATIENT)
Dept: FAMILY MEDICINE | Facility: CLINIC | Age: 28
End: 2023-01-10
Payer: COMMERCIAL

## 2023-01-10 VITALS
OXYGEN SATURATION: 98 % | RESPIRATION RATE: 22 BRPM | HEIGHT: 64 IN | HEART RATE: 102 BPM | BODY MASS INDEX: 42.32 KG/M2 | DIASTOLIC BLOOD PRESSURE: 137 MMHG | WEIGHT: 247.9 LBS | TEMPERATURE: 98.6 F | SYSTOLIC BLOOD PRESSURE: 207 MMHG

## 2023-01-10 DIAGNOSIS — Z13.6 CARDIOVASCULAR SCREENING; LDL GOAL LESS THAN 130: ICD-10-CM

## 2023-01-10 DIAGNOSIS — R74.01 ELEVATED ALT MEASUREMENT: ICD-10-CM

## 2023-01-10 DIAGNOSIS — I10 ESSENTIAL HYPERTENSION: ICD-10-CM

## 2023-01-10 DIAGNOSIS — Z00.00 ROUTINE PHYSICAL EXAMINATION: Primary | ICD-10-CM

## 2023-01-10 DIAGNOSIS — Z28.21 COVID-19 VACCINATION REFUSED: ICD-10-CM

## 2023-01-10 DIAGNOSIS — E66.01 MORBID OBESITY (H): ICD-10-CM

## 2023-01-10 DIAGNOSIS — L83 ACANTHOSIS NIGRICANS: ICD-10-CM

## 2023-01-10 DIAGNOSIS — N18.1 CHRONIC KIDNEY DISEASE, STAGE 1: ICD-10-CM

## 2023-01-10 DIAGNOSIS — Z23 NEED FOR IMMUNIZATION AGAINST INFLUENZA: ICD-10-CM

## 2023-01-10 DIAGNOSIS — R73.03 PREDIABETES: ICD-10-CM

## 2023-01-10 LAB
ALBUMIN SERPL-MCNC: 4.4 G/DL (ref 3.4–5)
ALP SERPL-CCNC: 87 U/L (ref 40–150)
ALT SERPL W P-5'-P-CCNC: 89 U/L (ref 0–70)
ANION GAP SERPL CALCULATED.3IONS-SCNC: 7 MMOL/L (ref 3–14)
AST SERPL W P-5'-P-CCNC: 43 U/L (ref 0–45)
BILIRUB SERPL-MCNC: 0.9 MG/DL (ref 0.2–1.3)
BUN SERPL-MCNC: 12 MG/DL (ref 7–30)
CALCIUM SERPL-MCNC: 9.5 MG/DL (ref 8.5–10.1)
CHLORIDE BLD-SCNC: 102 MMOL/L (ref 94–109)
CHOLEST SERPL-MCNC: 215 MG/DL
CO2 SERPL-SCNC: 31 MMOL/L (ref 20–32)
CREAT SERPL-MCNC: 0.95 MG/DL (ref 0.66–1.25)
ERYTHROCYTE [DISTWIDTH] IN BLOOD BY AUTOMATED COUNT: 12.8 % (ref 10–15)
FASTING STATUS PATIENT QL REPORTED: NO
GFR SERPL CREATININE-BSD FRML MDRD: >90 ML/MIN/1.73M2
GLUCOSE BLD-MCNC: 93 MG/DL (ref 70–99)
HBA1C MFR BLD: 6.1 % (ref 0–5.6)
HCT VFR BLD AUTO: 49.2 % (ref 40–53)
HDLC SERPL-MCNC: 51 MG/DL
HGB BLD-MCNC: 16.2 G/DL (ref 13.3–17.7)
LDLC SERPL CALC-MCNC: 129 MG/DL
MCH RBC QN AUTO: 28.8 PG (ref 26.5–33)
MCHC RBC AUTO-ENTMCNC: 32.9 G/DL (ref 31.5–36.5)
MCV RBC AUTO: 87 FL (ref 78–100)
NONHDLC SERPL-MCNC: 164 MG/DL
PLATELET # BLD AUTO: 253 10E3/UL (ref 150–450)
POTASSIUM BLD-SCNC: 3.9 MMOL/L (ref 3.4–5.3)
PROT SERPL-MCNC: 8.8 G/DL (ref 6.8–8.8)
RBC # BLD AUTO: 5.63 10E6/UL (ref 4.4–5.9)
SODIUM SERPL-SCNC: 140 MMOL/L (ref 133–144)
TRIGL SERPL-MCNC: 174 MG/DL
TSH SERPL DL<=0.005 MIU/L-ACNC: 3.45 MU/L (ref 0.4–4)
WBC # BLD AUTO: 9.5 10E3/UL (ref 4–11)

## 2023-01-10 PROCEDURE — 99214 OFFICE O/P EST MOD 30 MIN: CPT | Mod: 25 | Performed by: NURSE PRACTITIONER

## 2023-01-10 PROCEDURE — 82043 UR ALBUMIN QUANTITATIVE: CPT | Performed by: NURSE PRACTITIONER

## 2023-01-10 PROCEDURE — 83036 HEMOGLOBIN GLYCOSYLATED A1C: CPT | Performed by: NURSE PRACTITIONER

## 2023-01-10 PROCEDURE — 90471 IMMUNIZATION ADMIN: CPT | Performed by: NURSE PRACTITIONER

## 2023-01-10 PROCEDURE — 85027 COMPLETE CBC AUTOMATED: CPT | Performed by: NURSE PRACTITIONER

## 2023-01-10 PROCEDURE — 80053 COMPREHEN METABOLIC PANEL: CPT | Performed by: NURSE PRACTITIONER

## 2023-01-10 PROCEDURE — 80061 LIPID PANEL: CPT | Performed by: NURSE PRACTITIONER

## 2023-01-10 PROCEDURE — 36415 COLL VENOUS BLD VENIPUNCTURE: CPT | Performed by: NURSE PRACTITIONER

## 2023-01-10 PROCEDURE — 84443 ASSAY THYROID STIM HORMONE: CPT | Performed by: NURSE PRACTITIONER

## 2023-01-10 PROCEDURE — 90686 IIV4 VACC NO PRSV 0.5 ML IM: CPT | Performed by: NURSE PRACTITIONER

## 2023-01-10 PROCEDURE — 82570 ASSAY OF URINE CREATININE: CPT | Performed by: NURSE PRACTITIONER

## 2023-01-10 PROCEDURE — 93000 ELECTROCARDIOGRAM COMPLETE: CPT | Performed by: NURSE PRACTITIONER

## 2023-01-10 RX ORDER — LISINOPRIL AND HYDROCHLOROTHIAZIDE 20; 25 MG/1; MG/1
1 TABLET ORAL DAILY
Qty: 90 TABLET | Refills: 3 | Status: SHIPPED | OUTPATIENT
Start: 2023-01-10 | End: 2024-01-02

## 2023-01-10 ASSESSMENT — PAIN SCALES - GENERAL: PAINLEVEL: NO PAIN (0)

## 2023-01-10 NOTE — PROGRESS NOTES
Assessment & Plan     Routine physical examination    - REVIEW OF HEALTH MAINTENANCE PROTOCOL ORDERS    Essential hypertension  Uncontrolled, getting him back on his BP medications, reviewed low salt diet, need for weight loss, regular exercise. He has home BP machine and is to check his BP 2-3 times/week, follow back 1 weeks for recheck BP.    EKG showed normal rate, rate 92,  rhythm, intervals, axis, no acute ST- t wave changes consistent with ischemia, normal EKG.  - lisinopril-hydrochlorothiazide (ZESTORETIC) 20-25 MG tablet  Dispense: 90 tablet; Refill: 3  - Comprehensive metabolic panel (BMP + Alb, Alk Phos, ALT, AST, Total. Bili, TP)  - Lipid panel reflex to direct LDL Fasting  - TSH with free T4 reflex  - CBC with platelets  - Albumin Random Urine Quantitative with Creat Ratio  - EKG 12-lead complete w/read - Clinics  - Albumin Random Urine Quantitative with Creat Ratio  - CBC with platelets  - TSH with free T4 reflex  - Lipid panel reflex to direct LDL Fasting  - Comprehensive metabolic panel (BMP + Alb, Alk Phos, ALT, AST, Total. Bili, TP)    Morbid obesity (H)  Benefits of weight loss reviewed in detail, encouraged him to cut back on the carbohydrates in the diet, consume more fruits and vegetables, drink plenty of water, avoid fruit juices, sodas, get 150 min moderate exercise/week.  Recheck weight in 6 months.      Acanthosis nigricans  Concerning for prediabetes vs overt diabetes, discussed risk factors, encouraged weight loss, regular exercise, calorie controlled, low carb diet.  - Comprehensive metabolic panel (BMP + Alb, Alk Phos, ALT, AST, Total. Bili, TP)  - Hemoglobin A1c  - Hemoglobin A1c  - Comprehensive metabolic panel (BMP + Alb, Alk Phos, ALT, AST, Total. Bili, TP)    Prediabetes  See above.    CARDIOVASCULAR SCREENING; LDL GOAL LESS THAN 130  Screening lipid panel    Need for immunization against influenza    - INFLUENZA VACCINE IM > 6 MONTHS VALENT IIV4 (AFLURIA/FLUZONE)    COVID-19  "vaccination refused  Conscientious objector      Ordering of each unique test  Prescription drug management         BMI13  minutes spent on the date of the encounter doing chart review, history and exam, documentation and further activities per the note:   Estimated body mass index is 42.48 kg/m  as calculated from the following:    Height as of this encounter: 1.627 m (5' 4.06\").    Weight as of this encounter: 112.4 kg (247 lb 14.4 oz).   Weight management plan: Discussed healthy diet and exercise guidelines    Work on weight loss  Regular exercise  See Patient Instructions    Return in about 2 years (around 1/10/2025), or if symptoms worsen or fail to improve, for Follow up.    Rocío Zelaya, VALERIE CNP  [facility] SRUTHI Carreon is a 27 year old presenting for the following health issues:  Hypertension      History of Present Illness       Hypertension: He presents for follow up of hypertension.  He does not check blood pressure  regularly outside of the clinic. Outside blood pressures have been over 140/90. He does not follow a low salt diet.     He has been out of his medication since October 2022, does not follow low salt diet and is not getting regular exercise.      Review of Systems   Constitutional, HEENT, cardiovascular, pulmonary, gi and gu systems are negative, except as otherwise noted.      Objective    BP (!) 207/137 (BP Location: Left arm, Patient Position: Sitting, Cuff Size: Adult Large)   Pulse 102   Temp 98.6  F (37  C) (Oral)   Resp 22   Ht 1.627 m (5' 4.06\")   Wt 112.4 kg (247 lb 14.4 oz)   SpO2 98%   BMI 42.48 kg/m    Body mass index is 42.48 kg/m .  Physical Exam   GENERAL: healthy, alert and no distress  EYES: Eyes grossly normal to inspection, PERRL and conjunctivae and sclerae normal  HENT: ear canals and TM's normal, nose and mouth without ulcers or lesions  NECK: no adenopathy, no asymmetry, masses, or scars and thyroid normal to " palpation  RESP: lungs clear to auscultation - no rales, rhonchi or wheezes  CV: regular rate and rhythm, normal S1 S2, no S3 or S4, no murmur, click or rub, no peripheral edema and peripheral pulses strong  ABDOMEN: soft, nontender, no hepatosplenomegaly, no masses and bowel sounds normal  MS: no gross musculoskeletal defects noted, no edema  SKIN: no suspicious lesions or rashes  NEURO: Normal strength and tone, mentation intact and speech normal  PSYCH: mentation appears normal, affect normal/bright  LYMPH: normal ant/post cervical, supraclavicular nodes  inguinal: no adenopathy    Results for orders placed or performed in visit on 01/10/23 (from the past 24 hour(s))   CBC with platelets   Result Value Ref Range    WBC Count 9.5 4.0 - 11.0 10e3/uL    RBC Count 5.63 4.40 - 5.90 10e6/uL    Hemoglobin 16.2 13.3 - 17.7 g/dL    Hematocrit 49.2 40.0 - 53.0 %    MCV 87 78 - 100 fL    MCH 28.8 26.5 - 33.0 pg    MCHC 32.9 31.5 - 36.5 g/dL    RDW 12.8 10.0 - 15.0 %    Platelet Count 253 150 - 450 10e3/uL   Hemoglobin A1c   Result Value Ref Range    Hemoglobin A1C 6.1 (H) 0.0 - 5.6 %

## 2023-01-10 NOTE — PATIENT INSTRUCTIONS
At [facility], we strive to deliver an exceptional experience to you, every time we see you. If you receive a survey, please complete it as we do value your feedback.  If you have MyChart, you can expect to receive results automatically within 24 hours of their completion.  Your provider will send a note interpreting your results as well.   If you do not have MyChart, you should receive your results in about a week by mail.    Your care team:                            Family Medicine Internal Medicine   MD Leonides Sandy MD Shantel Branch-Fleming, MD Srinivasa Vaka, MD Katya Belousova, PAVALERIE Parrish CNP, MD ([facility]) [facility]   Eugenio Quiroga, MD Jody Grace MD Amelia Massimini APRN TAB Zelaya APRN MD Abdelrahman Howard MD          Clinic hours: Monday - Thursday 7 am-6 pm; Fridays 7 am-5 pm.   Urgent care: Monday - Friday 10 am- 8 pm; Saturday and Sunday 9 am-5 pm.    Clinic: [phone]       [facility]: Monday - Thursday 8 am - 7 pm; Friday 8 am - 6 pm  [facility]: [phone]

## 2023-01-11 ENCOUNTER — TELEPHONE (OUTPATIENT)
Dept: FAMILY MEDICINE | Facility: CLINIC | Age: 28
End: 2023-01-11

## 2023-01-11 LAB
CREAT UR-MCNC: 268 MG/DL
MICROALBUMIN UR-MCNC: 201 MG/L
MICROALBUMIN/CREAT UR: 75 MG/G CR (ref 0–17)

## 2023-01-11 NOTE — TELEPHONE ENCOUNTER
Pt calling on his lab results that were done on 1/10/23.      Pt is requesting for provider interpretation.      Erica Valencia RN  [facility]

## 2023-01-14 PROBLEM — N18.1 CHRONIC KIDNEY DISEASE, STAGE 1: Status: ACTIVE | Noted: 2023-01-14

## 2023-04-03 ENCOUNTER — OFFICE VISIT (OUTPATIENT)
Dept: URGENT CARE | Facility: URGENT CARE | Age: 28
End: 2023-04-03
Payer: COMMERCIAL

## 2023-04-03 VITALS
BODY MASS INDEX: 56.62 KG/M2 | OXYGEN SATURATION: 96 % | SYSTOLIC BLOOD PRESSURE: 118 MMHG | TEMPERATURE: 101 F | HEART RATE: 104 BPM | RESPIRATION RATE: 16 BRPM | DIASTOLIC BLOOD PRESSURE: 79 MMHG | WEIGHT: 315 LBS

## 2023-04-03 DIAGNOSIS — N39.0 URINARY TRACT INFECTION IN MALE: Primary | ICD-10-CM

## 2023-04-03 DIAGNOSIS — R73.03 PREDIABETES: ICD-10-CM

## 2023-04-03 LAB
ALBUMIN UR-MCNC: NEGATIVE MG/DL
ANION GAP SERPL CALCULATED.3IONS-SCNC: 5 MMOL/L (ref 3–14)
APPEARANCE UR: ABNORMAL
BACTERIA #/AREA URNS HPF: ABNORMAL /HPF
BASOPHILS # BLD AUTO: 0.1 10E3/UL (ref 0–0.2)
BASOPHILS NFR BLD AUTO: 0 %
BILIRUB UR QL STRIP: NEGATIVE
BUN SERPL-MCNC: 13 MG/DL (ref 7–30)
CALCIUM SERPL-MCNC: 10.1 MG/DL (ref 8.5–10.1)
CHLORIDE BLD-SCNC: 99 MMOL/L (ref 94–109)
CO2 SERPL-SCNC: 30 MMOL/L (ref 20–32)
COLOR UR AUTO: YELLOW
CREAT SERPL-MCNC: 1.06 MG/DL (ref 0.66–1.25)
EOSINOPHIL # BLD AUTO: 0.1 10E3/UL (ref 0–0.7)
EOSINOPHIL NFR BLD AUTO: 1 %
ERYTHROCYTE [DISTWIDTH] IN BLOOD BY AUTOMATED COUNT: 13.5 % (ref 10–15)
GFR SERPL CREATININE-BSD FRML MDRD: >90 ML/MIN/1.73M2
GLUCOSE BLD-MCNC: 101 MG/DL (ref 70–99)
GLUCOSE UR STRIP-MCNC: NEGATIVE MG/DL
HBA1C MFR BLD: 6.3 % (ref 0–5.6)
HCT VFR BLD AUTO: 47.2 % (ref 40–53)
HGB BLD-MCNC: 15.6 G/DL (ref 13.3–17.7)
HGB UR QL STRIP: ABNORMAL
IMM GRANULOCYTES # BLD: 0 10E3/UL
IMM GRANULOCYTES NFR BLD: 0 %
KETONES UR STRIP-MCNC: NEGATIVE MG/DL
LEUKOCYTE ESTERASE UR QL STRIP: ABNORMAL
LYMPHOCYTES # BLD AUTO: 3.2 10E3/UL (ref 0.8–5.3)
LYMPHOCYTES NFR BLD AUTO: 18 %
MCH RBC QN AUTO: 29.1 PG (ref 26.5–33)
MCHC RBC AUTO-ENTMCNC: 33.1 G/DL (ref 31.5–36.5)
MCV RBC AUTO: 88 FL (ref 78–100)
MONOCYTES # BLD AUTO: 1.5 10E3/UL (ref 0–1.3)
MONOCYTES NFR BLD AUTO: 8 %
NEUTROPHILS # BLD AUTO: 13 10E3/UL (ref 1.6–8.3)
NEUTROPHILS NFR BLD AUTO: 73 %
NITRATE UR QL: NEGATIVE
PH UR STRIP: 5.5 [PH] (ref 5–7)
PLATELET # BLD AUTO: 264 10E3/UL (ref 150–450)
POTASSIUM BLD-SCNC: 3.9 MMOL/L (ref 3.4–5.3)
RBC # BLD AUTO: 5.36 10E6/UL (ref 4.4–5.9)
RBC #/AREA URNS AUTO: ABNORMAL /HPF
SODIUM SERPL-SCNC: 134 MMOL/L (ref 133–144)
SP GR UR STRIP: <=1.005 (ref 1–1.03)
UROBILINOGEN UR STRIP-ACNC: 0.2 E.U./DL
WBC # BLD AUTO: 17.8 10E3/UL (ref 4–11)
WBC #/AREA URNS AUTO: ABNORMAL /HPF

## 2023-04-03 PROCEDURE — 85025 COMPLETE CBC W/AUTO DIFF WBC: CPT | Performed by: EMERGENCY MEDICINE

## 2023-04-03 PROCEDURE — 36415 COLL VENOUS BLD VENIPUNCTURE: CPT | Performed by: EMERGENCY MEDICINE

## 2023-04-03 PROCEDURE — 80048 BASIC METABOLIC PNL TOTAL CA: CPT | Performed by: EMERGENCY MEDICINE

## 2023-04-03 PROCEDURE — 87186 SC STD MICRODIL/AGAR DIL: CPT | Performed by: EMERGENCY MEDICINE

## 2023-04-03 PROCEDURE — 83036 HEMOGLOBIN GLYCOSYLATED A1C: CPT | Performed by: EMERGENCY MEDICINE

## 2023-04-03 PROCEDURE — 96372 THER/PROPH/DIAG INJ SC/IM: CPT | Performed by: EMERGENCY MEDICINE

## 2023-04-03 PROCEDURE — 99214 OFFICE O/P EST MOD 30 MIN: CPT | Mod: 25 | Performed by: EMERGENCY MEDICINE

## 2023-04-03 PROCEDURE — 81001 URINALYSIS AUTO W/SCOPE: CPT | Performed by: EMERGENCY MEDICINE

## 2023-04-03 PROCEDURE — 87086 URINE CULTURE/COLONY COUNT: CPT | Performed by: EMERGENCY MEDICINE

## 2023-04-03 RX ORDER — SULFAMETHOXAZOLE/TRIMETHOPRIM 800-160 MG
1 TABLET ORAL 2 TIMES DAILY
Qty: 14 TABLET | Refills: 0 | Status: SHIPPED | OUTPATIENT
Start: 2023-04-03 | End: 2023-04-10

## 2023-04-03 RX ORDER — CEFTRIAXONE SODIUM 1 G
1 VIAL (EA) INJECTION ONCE
Status: COMPLETED | OUTPATIENT
Start: 2023-04-03 | End: 2023-04-03

## 2023-04-03 RX ADMIN — Medication 1 G: at 17:24

## 2023-04-03 NOTE — PATIENT INSTRUCTIONS
Go to the ER with worsening pain, inability to pee, vomiting (not keeping the antibiotic down), back/flank pain or high fevers or other concerns.

## 2023-04-03 NOTE — PROGRESS NOTES
Assessment & Plan     Diagnosis:  (N39.0) Urinary tract infection in male  (primary encounter diagnosis)  Plan: sulfamethoxazole-trimethoprim (BACTRIM DS)         800-160 MG tablet            Medical Decision Making:  Lauri Ma is a 27 year old male who presents to clinic today for evaluation of urinary frequency, urgency and dysuria.     This clinically is consistent with a urinary tract infection.  Patient notes symptoms started this morning and he is very well-appearing on exam here with a completely benign abdominal exam and no CVA tenderness.  Urinalysis confirms the infection. Given fever, tachycardia and elevated white count, would treat with a pyelonephritis course of antibiotics to be safe although more likely this is still just a UTI at this time. We did given him a dose of IM Rocephin here in clinic as well given patient meeting sepsis criteria. Will go home on Bactrim. CBC shows leukovyotsis at 17.8 with left shift. BMP is unremarkable.    There is no clinical evidence of appendicitis, colitis, diverticulitis or any intraabdominal catastrophe. The patient will be started on antibiotics for the infection. Discussed follow up with PCP in 1-2 days for recheck and to go to the ER if increasing pain, vomiting, fever, or inability to tolerate the oral antibiotic.  Urine culture was sent and a provider will contact the patient if a change of antibiotics is indicated. Follow up with primary physician is indicated if not improving in 2-3 days. The patient verbalized understanding and agreement with the plan including reasons to go to the emergency room.  Patient was discharged in stable condition.      Stephen Patel PA-C  [facility] URGENT CARE    Subjective     Lauri Ma is a 27 year old male who presents to clinic today for the following health issues:  Chief Complaint   Patient presents with     UTI     Possible UTI, urgency, frequency, even after he urinate he still feels the urge to go again.  Noticed it this morning. Concern of STD       HPI  Patient states that since this morning he has experienced a burning sensation, and frequency and urgency of urination. This has stayed about the same throughout the day. Patient denies any flank or back pain, abdominal pain, fever, nausea, vomiting, diarrhea, inability to urinate, penile discharge, hematuria, testicular pain or concerns for STDS. Patient notes he is happily . No hx of UTI or kidney stones.  Patient unaware he has a fever.     Review of Systems    See HPI    Objective      Vitals:    Patient Vitals for the past 24 hrs:   BP Temp Temp src Pulse Resp SpO2 Weight   04/03/23 1718 -- -- -- 104 16 -- --   04/03/23 1638 118/79 (!) 101  F (38.3  C) Tympanic (!) 123 -- 96 % 149.9 kg (330 lb 6.4 oz)       Vital signs reviewed by: Stephen Patel PA-C    Physical Exam   Constitutional: The patient is oriented to person, place, and time. Alert and cooperative. No acute distress.  Mouth: Mucous membranes are moist.  Cardiovascular: Tachycardic. Regular rhythm.   Pulmonary/Chest: Effort normal. No respiratory distress.   GI: Abdomen is soft, non-tender and non-distended throughout. No rebound or guarding. No McBurney point tenderness.   MSK: No CVA tenderness bilaterally.  Neurological: Alert and oriented x3.       Labs/Imaging:  Results for orders placed or performed in visit on 04/03/23   UA Macro with Reflex to Micro and Culture - lab collect     Status: Abnormal    Specimen: Urine, Clean Catch   Result Value Ref Range    Color Urine Yellow Colorless, Straw, Light Yellow, Yellow    Appearance Urine Slightly Cloudy (A) Clear    Glucose Urine Negative Negative mg/dL    Bilirubin Urine Negative Negative    Ketones Urine Negative Negative mg/dL    Specific Gravity Urine <=1.005 1.003 - 1.035    Blood Urine Large (A) Negative    pH Urine 5.5 5.0 - 7.0    Protein Albumin Urine Negative Negative mg/dL    Urobilinogen Urine 0.2 0.2, 1.0 E.U./dL    Nitrite Urine  Negative Negative    Leukocyte Esterase Urine Large (A) Negative   Urine Microscopic Exam     Status: Abnormal   Result Value Ref Range    Bacteria Urine Moderate (A) None Seen /HPF    RBC Urine 0-2 0-2 /HPF /HPF    WBC Urine  (A) 0-5 /HPF /HPF                 Basic metabolic panel  (Ca, Cl, CO2, Creat, Gluc, K, Na, BUN)     Status: Abnormal   Result Value Ref Range    Sodium 134 133 - 144 mmol/L    Potassium 3.9 3.4 - 5.3 mmol/L    Chloride 99 94 - 109 mmol/L    Carbon Dioxide (CO2) 30 20 - 32 mmol/L    Anion Gap 5 3 - 14 mmol/L    Urea Nitrogen 13 7 - 30 mg/dL    Creatinine 1.06 0.66 - 1.25 mg/dL    Calcium 10.1 8.5 - 10.1 mg/dL    Glucose 101 (H) 70 - 99 mg/dL    GFR Estimate >90 >60 mL/min/1.73m2   CBC with platelets and differential     Status: Abnormal   Result Value Ref Range    WBC Count 17.8 (H) 4.0 - 11.0 10e3/uL    RBC Count 5.36 4.40 - 5.90 10e6/uL    Hemoglobin 15.6 13.3 - 17.7 g/dL    Hematocrit 47.2 40.0 - 53.0 %    MCV 88 78 - 100 fL    MCH 29.1 26.5 - 33.0 pg    MCHC 33.1 31.5 - 36.5 g/dL    RDW 13.5 10.0 - 15.0 %    Platelet Count 264 150 - 450 10e3/uL    % Neutrophils 73 %    % Lymphocytes 18 %    % Monocytes 8 %    % Eosinophils 1 %    % Basophils 0 %    % Immature Granulocytes 0 %    Absolute Neutrophils 13.0 (H) 1.6 - 8.3 10e3/uL    Absolute Lymphocytes 3.2 0.8 - 5.3 10e3/uL    Absolute Monocytes 1.5 (H) 0.0 - 1.3 10e3/uL    Absolute Eosinophils 0.1 0.0 - 0.7 10e3/uL    Absolute Basophils 0.1 0.0 - 0.2 10e3/uL    Absolute Immature Granulocytes 0.0 <=0.4 10e3/uL   CBC with platelets and differential     Status: Abnormal    Narrative    The following orders were created for panel order CBC with platelets and differential.  Procedure                               Abnormality         Status                     ---------                               -----------         ------                     CBC with platelets and d...[[id number]]  Abnormal            Final result                 Please  view results for these tests on the individual orders.       Interventions:  Rocephin 1g IM    Stephen Patel PA-C, April 3, 2023

## 2023-04-04 LAB — BACTERIA UR CULT: ABNORMAL

## 2024-01-02 DIAGNOSIS — I10 ESSENTIAL HYPERTENSION: ICD-10-CM

## 2024-01-02 RX ORDER — LISINOPRIL AND HYDROCHLOROTHIAZIDE 20; 25 MG/1; MG/1
1 TABLET ORAL DAILY
Qty: 90 TABLET | Refills: 0 | Status: SHIPPED | OUTPATIENT
Start: 2024-01-02 | End: 2024-04-01

## 2024-02-17 ENCOUNTER — HEALTH MAINTENANCE LETTER (OUTPATIENT)
Age: 29
End: 2024-02-17

## 2024-03-30 DIAGNOSIS — R73.03 PREDIABETES: Primary | ICD-10-CM

## 2024-03-30 DIAGNOSIS — I10 ESSENTIAL HYPERTENSION: ICD-10-CM

## 2024-03-30 DIAGNOSIS — Z13.6 CARDIOVASCULAR SCREENING; LDL GOAL LESS THAN 130: ICD-10-CM

## 2024-04-01 DIAGNOSIS — I10 ESSENTIAL HYPERTENSION: ICD-10-CM

## 2024-04-01 RX ORDER — LISINOPRIL AND HYDROCHLOROTHIAZIDE 20; 25 MG/1; MG/1
1 TABLET ORAL DAILY
Qty: 30 TABLET | Refills: 0 | Status: SHIPPED | OUTPATIENT
Start: 2024-04-01 | End: 2024-05-06

## 2024-04-01 RX ORDER — LISINOPRIL AND HYDROCHLOROTHIAZIDE 20; 25 MG/1; MG/1
1 TABLET ORAL DAILY
Qty: 90 TABLET | OUTPATIENT
Start: 2024-04-01

## 2024-05-06 DIAGNOSIS — I10 ESSENTIAL HYPERTENSION: ICD-10-CM

## 2024-05-06 RX ORDER — LISINOPRIL AND HYDROCHLOROTHIAZIDE 20; 25 MG/1; MG/1
1 TABLET ORAL DAILY
Qty: 30 TABLET | Refills: 0 | Status: SHIPPED | OUTPATIENT
Start: 2024-05-06 | End: 2024-06-04

## 2024-05-07 RX ORDER — LISINOPRIL AND HYDROCHLOROTHIAZIDE 20; 25 MG/1; MG/1
1 TABLET ORAL DAILY
Qty: 90 TABLET | OUTPATIENT
Start: 2024-05-07

## 2024-06-04 DIAGNOSIS — I10 ESSENTIAL HYPERTENSION: ICD-10-CM

## 2024-06-04 RX ORDER — LISINOPRIL AND HYDROCHLOROTHIAZIDE 20; 25 MG/1; MG/1
1 TABLET ORAL DAILY
Qty: 90 TABLET | Refills: 0 | Status: SHIPPED | OUTPATIENT
Start: 2024-06-04 | End: 2024-09-06

## 2024-09-05 DIAGNOSIS — I10 ESSENTIAL HYPERTENSION: ICD-10-CM

## 2024-09-05 RX ORDER — LISINOPRIL AND HYDROCHLOROTHIAZIDE 20; 25 MG/1; MG/1
1 TABLET ORAL DAILY
Qty: 30 TABLET | Refills: 0 | OUTPATIENT
Start: 2024-09-05

## 2024-09-05 NOTE — TELEPHONE ENCOUNTER
Patient retuning call. He reports having only 6 days left of meds and PCP does not have any openings.   Scheduled with same-day provider only for medication refill. Encouraged patient to schedule a physical with PCP.     Pt verbalized understanding an agrees with plan.       No further questions/concerns.           Cat Castorena RN    [facility]

## 2024-09-05 NOTE — TELEPHONE ENCOUNTER
Due for OV/labs for further refills, Ok to see different provider if I have no open slots  Rocío PADILLA, CNP

## 2024-09-05 NOTE — TELEPHONE ENCOUNTER
Left message with person answering the phone (no CTC on file), to have the patient return our call.  Lian Nugent MA  [facility]   Primary Care

## 2024-09-06 ENCOUNTER — OFFICE VISIT (OUTPATIENT)
Dept: FAMILY MEDICINE | Facility: CLINIC | Age: 29
End: 2024-09-06
Payer: COMMERCIAL

## 2024-09-06 VITALS
HEART RATE: 85 BPM | TEMPERATURE: 98.4 F | HEIGHT: 64 IN | OXYGEN SATURATION: 98 % | RESPIRATION RATE: 16 BRPM | SYSTOLIC BLOOD PRESSURE: 151 MMHG | DIASTOLIC BLOOD PRESSURE: 87 MMHG | BODY MASS INDEX: 53.78 KG/M2 | WEIGHT: 315 LBS

## 2024-09-06 DIAGNOSIS — E66.01 MORBID OBESITY (H): ICD-10-CM

## 2024-09-06 DIAGNOSIS — I10 ESSENTIAL HYPERTENSION: Primary | ICD-10-CM

## 2024-09-06 DIAGNOSIS — R73.03 PREDIABETES: ICD-10-CM

## 2024-09-06 LAB
HBA1C MFR BLD: 6.2 % (ref 0–5.6)
HGB BLD-MCNC: 15.9 G/DL (ref 13.3–17.7)

## 2024-09-06 PROCEDURE — 90656 IIV3 VACC NO PRSV 0.5 ML IM: CPT | Performed by: PHYSICIAN ASSISTANT

## 2024-09-06 PROCEDURE — 85018 HEMOGLOBIN: CPT | Performed by: PHYSICIAN ASSISTANT

## 2024-09-06 PROCEDURE — 82043 UR ALBUMIN QUANTITATIVE: CPT | Performed by: PHYSICIAN ASSISTANT

## 2024-09-06 PROCEDURE — 90471 IMMUNIZATION ADMIN: CPT | Performed by: PHYSICIAN ASSISTANT

## 2024-09-06 PROCEDURE — 36415 COLL VENOUS BLD VENIPUNCTURE: CPT | Performed by: PHYSICIAN ASSISTANT

## 2024-09-06 PROCEDURE — 83036 HEMOGLOBIN GLYCOSYLATED A1C: CPT | Performed by: PHYSICIAN ASSISTANT

## 2024-09-06 PROCEDURE — 80061 LIPID PANEL: CPT | Performed by: PHYSICIAN ASSISTANT

## 2024-09-06 PROCEDURE — 99214 OFFICE O/P EST MOD 30 MIN: CPT | Mod: 25 | Performed by: PHYSICIAN ASSISTANT

## 2024-09-06 PROCEDURE — 82570 ASSAY OF URINE CREATININE: CPT | Performed by: PHYSICIAN ASSISTANT

## 2024-09-06 PROCEDURE — 80053 COMPREHEN METABOLIC PANEL: CPT | Performed by: PHYSICIAN ASSISTANT

## 2024-09-06 RX ORDER — LISINOPRIL AND HYDROCHLOROTHIAZIDE 20; 25 MG/1; MG/1
1 TABLET ORAL DAILY
Qty: 90 TABLET | Refills: 0 | Status: SHIPPED | OUTPATIENT
Start: 2024-09-06

## 2024-09-06 ASSESSMENT — PATIENT HEALTH QUESTIONNAIRE - PHQ9
10. IF YOU CHECKED OFF ANY PROBLEMS, HOW DIFFICULT HAVE THESE PROBLEMS MADE IT FOR YOU TO DO YOUR WORK, TAKE CARE OF THINGS AT HOME, OR GET ALONG WITH OTHER PEOPLE: SOMEWHAT DIFFICULT
SUM OF ALL RESPONSES TO PHQ QUESTIONS 1-9: 6
SUM OF ALL RESPONSES TO PHQ QUESTIONS 1-9: 6

## 2024-09-06 ASSESSMENT — PAIN SCALES - GENERAL: PAINLEVEL: NO PAIN (0)

## 2024-09-06 NOTE — PROGRESS NOTES
"      Assessment & Plan     Essential hypertension  Elevated today, under fair amount of stress recently.  Would recommend a recheck in 2 weeks, consider doing a physical at that time.  Start checking at home.  Record values and bring to next visit.  Medication refilled but discussed if this is still running high and may need to be increased.  - Comprehensive metabolic panel (BMP + Alb, Alk Phos, ALT, AST, Total. Bili, TP); Future  - Albumin Random Urine Quantitative with Creat Ratio; Future  - lisinopril-hydrochlorothiazide (ZESTORETIC) 20-25 MG tablet; Take 1 tablet by mouth daily.    Prediabetes  Reviewed the trend of his A1c over the last few years.  Very close to diabetic level on his last check over a year ago.  Discussed that he may have diabetes now and we will see with lab results today.  May need to discuss medications depending on this and a follow-up visit would help with this as well.  - Hemoglobin A1c; Future  - Lipid panel reflex to direct LDL Non-fasting; Future    Morbid obesity (H)  Continues to have weight gain gradually compared to last values.  - Lipid panel reflex to direct LDL Non-fasting; Future          BMI  Estimated body mass index is 57.77 kg/m  as calculated from the following:    Height as of this encounter: 1.627 m (5' 4.06\").    Weight as of this encounter: 153 kg (337 lb 3.2 oz).   Weight management plan: Discussed healthy diet and exercise guidelines          Richard Carreon is a 29 year old, presenting for the following health issues:  Hypertension        9/6/2024     1:01 PM   Additional Questions   Roomed by Aldair     History of Present Illness       Hypertension: He presents for follow up of hypertension.  He does not check blood pressure  regularly outside of the clinic. Outside blood pressures have been over 140/90. He does not follow a low salt diet.     He eats 2-3 servings of fruits and vegetables daily.He consumes 3 sweetened beverage(s) daily.He exercises with " "enough effort to increase his heart rate 10 to 19 minutes per day.  He exercises with enough effort to increase his heart rate 3 or less days per week.   He is taking medications regularly.     Has a few days left of his blood pressure medication, does take it every day.  Does not monitor at home.  Not following a specific diet.  Exercises by walking mainly at work.  Has been under a lot of stress recently, brother  from complications of kidney failure and infected port leading to what sounds like endocarditis.  They buried him in the last week.                  Objective    BP (!) 151/87 (BP Location: Right arm, Patient Position: Sitting, Cuff Size: Adult Regular)   Pulse 85   Temp 98.4  F (36.9  C) (Temporal)   Resp 16   Ht 1.627 m (5' 4.06\")   Wt (!) 153 kg (337 lb 3.2 oz)   SpO2 98%   BMI 57.77 kg/m    Body mass index is 57.77 kg/m .  Physical Exam   GENERAL: alert and no distress  EYES: Eyes grossly normal to inspection, PERRL and conjunctivae and sclerae normal  RESP: lungs clear to auscultation - no rales, rhonchi or wheezes  CV: regular rate and rhythm, normal S1 S2, no S3 or S4, no murmur, click or rub, no peripheral edema             Signed Electronically by: Rosanne Kunz PA-C    "

## 2024-09-07 LAB
ALBUMIN SERPL BCG-MCNC: 4.6 G/DL (ref 3.5–5.2)
ALP SERPL-CCNC: 73 U/L (ref 40–150)
ALT SERPL W P-5'-P-CCNC: 68 U/L (ref 0–70)
ANION GAP SERPL CALCULATED.3IONS-SCNC: 12 MMOL/L (ref 7–15)
AST SERPL W P-5'-P-CCNC: 46 U/L (ref 0–45)
BILIRUB SERPL-MCNC: 0.7 MG/DL
BUN SERPL-MCNC: 14 MG/DL (ref 6–20)
CALCIUM SERPL-MCNC: 9.7 MG/DL (ref 8.8–10.4)
CHLORIDE SERPL-SCNC: 98 MMOL/L (ref 98–107)
CHOLEST SERPL-MCNC: 188 MG/DL
CREAT SERPL-MCNC: 0.82 MG/DL (ref 0.67–1.17)
CREAT UR-MCNC: 77.6 MG/DL
EGFRCR SERPLBLD CKD-EPI 2021: >90 ML/MIN/1.73M2
FASTING STATUS PATIENT QL REPORTED: YES
FASTING STATUS PATIENT QL REPORTED: YES
GLUCOSE SERPL-MCNC: 77 MG/DL (ref 70–99)
HCO3 SERPL-SCNC: 24 MMOL/L (ref 22–29)
HDLC SERPL-MCNC: 46 MG/DL
LDLC SERPL CALC-MCNC: 94 MG/DL
MICROALBUMIN UR-MCNC: <12 MG/L
MICROALBUMIN/CREAT UR: NORMAL MG/G{CREAT}
NONHDLC SERPL-MCNC: 142 MG/DL
POTASSIUM SERPL-SCNC: 3.7 MMOL/L (ref 3.4–5.3)
PROT SERPL-MCNC: 7.8 G/DL (ref 6.4–8.3)
SODIUM SERPL-SCNC: 134 MMOL/L (ref 135–145)
TRIGL SERPL-MCNC: 240 MG/DL

## 2024-09-20 ENCOUNTER — OFFICE VISIT (OUTPATIENT)
Dept: FAMILY MEDICINE | Facility: CLINIC | Age: 29
End: 2024-09-20
Payer: COMMERCIAL

## 2024-09-20 VITALS
BODY MASS INDEX: 50.62 KG/M2 | HEIGHT: 66 IN | TEMPERATURE: 97.3 F | DIASTOLIC BLOOD PRESSURE: 83 MMHG | RESPIRATION RATE: 16 BRPM | HEART RATE: 94 BPM | OXYGEN SATURATION: 96 % | WEIGHT: 315 LBS | SYSTOLIC BLOOD PRESSURE: 114 MMHG

## 2024-09-20 DIAGNOSIS — I10 ESSENTIAL HYPERTENSION: Primary | ICD-10-CM

## 2024-09-20 DIAGNOSIS — E66.01 MORBID OBESITY (H): ICD-10-CM

## 2024-09-20 PROCEDURE — G2211 COMPLEX E/M VISIT ADD ON: HCPCS | Performed by: NURSE PRACTITIONER

## 2024-09-20 PROCEDURE — 99214 OFFICE O/P EST MOD 30 MIN: CPT | Performed by: NURSE PRACTITIONER

## 2024-09-20 ASSESSMENT — PAIN SCALES - GENERAL: PAINLEVEL: NO PAIN (0)

## 2024-09-20 NOTE — PATIENT INSTRUCTIONS
At [facility], we strive to deliver an exceptional experience to you, every time we see you. If you receive a survey, please let us know what we are doing well and/or what we could improve upon, as we do value your feedback.  If you have MyChart, you can expect to receive results automatically within 24 hours of their completion.  Your provider will send a note interpreting your results as well.   If you do not have MyChart, you should receive your results in about a week by mail.    Your care team:                            Family Medicine Internal Medicine   MD Leonides Sandy, MD Maria Del Rosario Richey, MD Asael Stevens, MD Rosi Savage, PAHamzahC    Braxton Calabrese, MD Pediatrics   Stacie Darby, MD Jody Medina, MD Cat Zelaya, APRN CNP Aaliyah Dukes APRN CNP   MD Ave Claros, MD Saima Woodson, CNP     Calin Hoyt, CNP Same-Day Provider (No follow-up visits)   VALERIE Hartman, DNP Fadumo Trammell, VALERIE Mitchell, FNP, BC KATHY NullC     Clinic hours: Monday - Thursday 7 am-6 pm; Fridays 7 am-5 pm.   Urgent care: Monday - Friday 10 am- 8 pm; Saturday and Sunday 9 am-5 pm.    Clinic: [phone]       [facility]: Monday - Thursday 8 am - 7 pm; Friday 8 am - 6 pm  [facility]: [phone]

## 2024-09-20 NOTE — PROGRESS NOTES
Assessment & Plan     Essential hypertension  Controlled, compliant with medications. Does not report any headaches, blurry vision, dizziness, chest pain, shortness of breath, or palpitations. Follow in 3 months   Continue current medications. No change in management. Discussed DASH diet and dietary sodium restrictions. Increase dietary efforts and physical activity.  - PRIMARY CARE FOLLOW-UP SCHEDULING; Future    Morbid obesity   Likely contributing to HTN. Patient states he is making diet changes. Declines a nutritionist consult at this time  - Discussed appropriate BMI, diet modification and physical activity.         Richard Carreon is a 29 year old, presenting for the following health issues:  Follow Up and Hypertension      9/20/2024     3:48 PM   Additional Questions   Roomed by brandon   Accompanied by self         9/20/2024   Forms   Any forms needing to be completed Yes        History of Present Illness       Hypertension: He presents for follow up of hypertension.  He does not check blood pressure  regularly outside of the clinic. Outside blood pressures have been over 140/90. He does not follow a low salt diet.     He eats 0-1 servings of fruits and vegetables daily.He consumes 2 sweetened beverage(s) daily.He exercises with enough effort to increase his heart rate 10 to 19 minutes per day.  He exercises with enough effort to increase his heart rate 3 or less days per week.   He is taking medications regularly.              Review of Systems  Constitutional, HEENT, cardiovascular, pulmonary, gi and gu systems are negative, except as otherwise noted.      Objective    There were no vitals taken for this visit.  There is no height or weight on file to calculate BMI.  Physical Exam   GENERAL: alert and no distress  RESP: lungs clear to auscultation - no rales, rhonchi or wheezes  CV: regular rate and rhythm, normal S1 S2, no S3 or S4, no murmur, click or rub, no peripheral edema  ABDOMEN: soft,  nontender, no hepatosplenomegaly, no masses and bowel sounds normal  MS: no gross musculoskeletal defects noted, no edema  NEURO: Normal strength and tone, mentation intact and speech normal  PSYCH: mentation appears normal, affect normal/bright          Signed Electronically by: VALERIE Hartman CNP

## 2024-12-09 DIAGNOSIS — I10 ESSENTIAL HYPERTENSION: ICD-10-CM

## 2024-12-10 RX ORDER — LISINOPRIL AND HYDROCHLOROTHIAZIDE 20; 25 MG/1; MG/1
1 TABLET ORAL DAILY
Qty: 90 TABLET | Refills: 2 | Status: SHIPPED | OUTPATIENT
Start: 2024-12-10

## 2025-03-08 ENCOUNTER — HEALTH MAINTENANCE LETTER (OUTPATIENT)
Age: 30
End: 2025-03-08

## 2025-08-24 DIAGNOSIS — I10 ESSENTIAL HYPERTENSION: ICD-10-CM

## 2025-08-25 RX ORDER — LISINOPRIL AND HYDROCHLOROTHIAZIDE 20; 25 MG/1; MG/1
1 TABLET ORAL DAILY
Qty: 90 TABLET | Refills: 0 | Status: SHIPPED | OUTPATIENT
Start: 2025-08-25

## (undated) DEVICE — DRAPE STERI TOWEL SM 1000

## (undated) DEVICE — DRSG GAUZE 4X4" TOPPER

## (undated) DEVICE — GLOVE PROTEXIS W/NEU-THERA 8.0  2D73TE80

## (undated) DEVICE — NDL 19GA 1.5"

## (undated) DEVICE — PREP DURAPREP 26ML APL 8630

## (undated) DEVICE — SU ETHILON 3-0 PS-2 18" 1669H

## (undated) DEVICE — PACK HAND WRIST SOP15HWFSP

## (undated) DEVICE — GLOVE PROTEXIS BLUE W/NEU-THERA 7.0  2D73EB70

## (undated) DEVICE — GLOVE PROTEXIS W/NEU-THERA 7.0  2D73TE70

## (undated) DEVICE — DRSG XEROFORM 1X8"

## (undated) DEVICE — BNDG ELASTIC 4"X5YDS UNSTERILE 6611-40